# Patient Record
Sex: FEMALE | Race: WHITE | Employment: STUDENT | URBAN - METROPOLITAN AREA
[De-identification: names, ages, dates, MRNs, and addresses within clinical notes are randomized per-mention and may not be internally consistent; named-entity substitution may affect disease eponyms.]

---
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
## 2020-10-30 ENCOUNTER — OFFICE VISIT (OUTPATIENT)
Dept: PRIMARY CARE CLINIC | Age: 20
End: 2020-10-30
Payer: COMMERCIAL

## 2020-10-30 VITALS
HEART RATE: 81 BPM | TEMPERATURE: 98 F | SYSTOLIC BLOOD PRESSURE: 118 MMHG | RESPIRATION RATE: 16 BRPM | HEIGHT: 65 IN | OXYGEN SATURATION: 100 % | BODY MASS INDEX: 22.33 KG/M2 | DIASTOLIC BLOOD PRESSURE: 82 MMHG | WEIGHT: 134 LBS

## 2020-10-30 PROCEDURE — 99395 PREV VISIT EST AGE 18-39: CPT | Performed by: FAMILY MEDICINE

## 2020-10-30 RX ORDER — LEVONORGESTREL AND ETHINYL ESTRADIOL 0.15-0.03
KIT ORAL
COMMUNITY
Start: 2020-10-20

## 2020-10-30 RX ORDER — CLINDAMYCIN PHOSPHATE AND BENZOYL PEROXIDE 10; 37.5 MG/G; MG/G
GEL TOPICAL
COMMUNITY

## 2020-10-30 SDOH — ECONOMIC STABILITY: FOOD INSECURITY: WITHIN THE PAST 12 MONTHS, THE FOOD YOU BOUGHT JUST DIDN'T LAST AND YOU DIDN'T HAVE MONEY TO GET MORE.: NEVER TRUE

## 2020-10-30 SDOH — ECONOMIC STABILITY: INCOME INSECURITY: HOW HARD IS IT FOR YOU TO PAY FOR THE VERY BASICS LIKE FOOD, HOUSING, MEDICAL CARE, AND HEATING?: NOT HARD AT ALL

## 2020-10-30 SDOH — ECONOMIC STABILITY: TRANSPORTATION INSECURITY
IN THE PAST 12 MONTHS, HAS LACK OF TRANSPORTATION KEPT YOU FROM MEETINGS, WORK, OR FROM GETTING THINGS NEEDED FOR DAILY LIVING?: NO

## 2020-10-30 SDOH — ECONOMIC STABILITY: FOOD INSECURITY: WITHIN THE PAST 12 MONTHS, YOU WORRIED THAT YOUR FOOD WOULD RUN OUT BEFORE YOU GOT MONEY TO BUY MORE.: NEVER TRUE

## 2020-10-30 SDOH — ECONOMIC STABILITY: TRANSPORTATION INSECURITY
IN THE PAST 12 MONTHS, HAS THE LACK OF TRANSPORTATION KEPT YOU FROM MEDICAL APPOINTMENTS OR FROM GETTING MEDICATIONS?: NO

## 2020-10-30 ASSESSMENT — ENCOUNTER SYMPTOMS
COUGH: 0
RHINORRHEA: 0
TROUBLE SWALLOWING: 0
DIARRHEA: 0
NAUSEA: 0
EYE REDNESS: 0
BACK PAIN: 0
EYE ITCHING: 0
ABDOMINAL DISTENTION: 0
VOICE CHANGE: 0
EYE PAIN: 0
CHOKING: 0
EYE DISCHARGE: 0
BLOOD IN STOOL: 0
SINUS PAIN: 0
CONSTIPATION: 0
SORE THROAT: 0
PHOTOPHOBIA: 0
VOMITING: 0
ABDOMINAL PAIN: 0
COLOR CHANGE: 0
SINUS PRESSURE: 0
CHEST TIGHTNESS: 0
WHEEZING: 0
APNEA: 0
SHORTNESS OF BREATH: 0

## 2020-10-30 ASSESSMENT — PATIENT HEALTH QUESTIONNAIRE - PHQ9
SUM OF ALL RESPONSES TO PHQ9 QUESTIONS 1 & 2: 0
SUM OF ALL RESPONSES TO PHQ QUESTIONS 1-9: 0
1. LITTLE INTEREST OR PLEASURE IN DOING THINGS: 0
SUM OF ALL RESPONSES TO PHQ QUESTIONS 1-9: 0
2. FEELING DOWN, DEPRESSED OR HOPELESS: 0
SUM OF ALL RESPONSES TO PHQ QUESTIONS 1-9: 0

## 2020-10-30 NOTE — PROGRESS NOTES
SUBJECTIVE:  Patient ID: Lauren Paul is a 21 y.o. female. Chief Complaint:  Chief Complaint   Patient presents with    Annual Exam       HPI   21year old Female  Annual  3 rd Year UC psychology & Occupational therapy     No past medical history on file. Past Surgical History:   Procedure Laterality Date    ARM SURGERY Left Age 9   [facility] WISDOM TOOTH EXTRACTION  2016     No Known Allergies    Family History   Problem Relation Age of Onset    Other Mother         Thalessemia minor   [facility] Elevated Lipids Father     Diabetes Maternal Grandmother     Thyroid Disease Maternal Grandmother     Heart Attack Paternal Grandfather 79    Other Maternal Aunt         Thallassemia     Social History     Social History Narrative    Study Psychology        Patient Active Problem List   Diagnosis    Thalassemia minor     Current Outpatient Medications   Medication Sig Dispense Refill    Darline Amour 0.15-30 MG-MCG per tablet TK 1 T PO QD      tretinoin (RETIN-A) 0.025 % cream Apply topically nightly Apply topically nightly.  Clindamycin Phos-Benzoyl Perox (ONEXTON) 1.2-3.75 % GEL Apply topically      norethindrone-ethinyl estradiol (JUNEL FE 1/20) 1-20 MG-MCG per tablet Take 1 tablet by mouth daily      norgestimate-ethinyl estradiol (SPRINTEC 28) 0.25-35 MG-MCG per tablet Take 1 tablet by mouth daily       No current facility-administered medications for this visit.       Lab Results   Component Value Date    WBC 6.8 11/30/2018    HGB 9.5 (L) 11/30/2018    HCT 30.5 (L) 11/30/2018    MCV 54.9 (L) 11/30/2018     11/30/2018     No results found for: CHOL  No results found for: TRIG  No results found for: HDL  No results found for: LDLCHOLESTEROL, LDLCALC  No results found for: LABVLDL, VLDL  No results found for: [facility]    Chemistry        Component Value Date/Time     11/30/2018 1424    K 4.4 11/30/2018 1424     11/30/2018 1424    CO2 25 11/30/2018 1424    BUN 11 11/30/2018 1424    CREATININE 0.6 11/30/2018 1424        Component Value Date/Time    CALCIUM 9.6 11/30/2018 1424    ALKPHOS 79 11/30/2018 1424    AST 25 11/30/2018 1424    ALT 18 11/30/2018 1424    BILITOT 0.3 11/30/2018 1424            Review of Systems   Constitutional: Negative for activity change, appetite change, chills, diaphoresis, fatigue, fever and unexpected weight change. HENT: Negative for congestion, ear discharge, ear pain, hearing loss, nosebleeds, postnasal drip, rhinorrhea, sinus pressure, sinus pain, sore throat, tinnitus, trouble swallowing and voice change. Eyes: Negative for photophobia, pain, discharge, redness, itching and visual disturbance. Respiratory: Negative for apnea, cough, choking, chest tightness, shortness of breath and wheezing. Cardiovascular: Negative for chest pain, palpitations and leg swelling. Gastrointestinal: Negative for abdominal distention, abdominal pain, blood in stool, constipation, diarrhea, nausea and vomiting. Endocrine: Negative for cold intolerance, heat intolerance, polydipsia, polyphagia and polyuria. Genitourinary: Negative for dysuria and frequency. Musculoskeletal: Negative for back pain and gait problem. Skin: Negative for color change and rash. Allergic/Immunologic: Negative for environmental allergies and food allergies. Neurological: Negative for dizziness, tremors, light-headedness, numbness and headaches. Hematological:        Thalessemia    Psychiatric/Behavioral: Negative for agitation, behavioral problems, decreased concentration and sleep disturbance. The patient is not hyperactive. OBJECTIVE:  /82   Pulse 81   Temp 98 °F (36.7 °C)   Resp 16   Ht 5' 4.75\" (1.645 m)   Wt 134 lb (60.8 kg)   LMP 10/26/2020   SpO2 100%   BMI 22.47 kg/m²   Physical Exam  Constitutional:       General: She is not in acute distress. Appearance: She is well-developed. She is not diaphoretic. HENT:      Head: Normocephalic.       Right Ear: External ear normal.      Left Ear: External ear normal.      Nose: Nose normal.   Eyes:      General: No scleral icterus. Right eye: No discharge. Left eye: No discharge. Conjunctiva/sclera: Conjunctivae normal.      Pupils: Pupils are equal, round, and reactive to light. Neck:      Musculoskeletal: Normal range of motion and neck supple. Thyroid: No thyromegaly. Cardiovascular:      Rate and Rhythm: Normal rate and regular rhythm. Heart sounds: Normal heart sounds. No murmur. Pulmonary:      Effort: Pulmonary effort is normal. No respiratory distress. Breath sounds: Normal breath sounds. No wheezing or rales. Chest:      Chest wall: No tenderness. Abdominal:      General: Bowel sounds are normal. There is no distension. Palpations: Abdomen is soft. There is no mass. Tenderness: There is no abdominal tenderness. There is no guarding or rebound. Musculoskeletal: Normal range of motion. Lymphadenopathy:      Cervical: No cervical adenopathy. Skin:     General: Skin is warm. Findings: No rash. Neurological:      Mental Status: She is alert and oriented to person, place, and time. Deep Tendon Reflexes: Reflexes are normal and symmetric. Psychiatric:         Behavior: Behavior normal.         Thought Content: Thought content normal.         Judgment: Judgment normal.         ASSESSMENT/PLAN:      Diagnosis Orders   1.  Routine physical examination         Enriqueta Almazan paper done  Copy of immunization  Declined Flu shot

## 2021-02-26 ENCOUNTER — OFFICE VISIT (OUTPATIENT)
Dept: PRIMARY CARE CLINIC | Age: 21
End: 2021-02-26
Payer: COMMERCIAL

## 2021-02-26 VITALS
BODY MASS INDEX: 22.3 KG/M2 | HEART RATE: 72 BPM | SYSTOLIC BLOOD PRESSURE: 117 MMHG | WEIGHT: 133 LBS | DIASTOLIC BLOOD PRESSURE: 71 MMHG | TEMPERATURE: 97.3 F

## 2021-02-26 DIAGNOSIS — D56.3 THALASSEMIA MINOR: ICD-10-CM

## 2021-02-26 DIAGNOSIS — R07.89 CHEST TIGHTNESS: Primary | ICD-10-CM

## 2021-02-26 LAB
A/G RATIO: 1.7 (ref 1.1–2.2)
ALBUMIN SERPL-MCNC: 4.6 G/DL (ref 3.4–5)
ALP BLD-CCNC: 71 U/L (ref 40–129)
ALT SERPL-CCNC: 17 U/L (ref 10–40)
ANION GAP SERPL CALCULATED.3IONS-SCNC: 12 MMOL/L (ref 3–16)
AST SERPL-CCNC: 26 U/L (ref 15–37)
BASOPHILS ABSOLUTE: 0 K/UL (ref 0–0.2)
BASOPHILS RELATIVE PERCENT: 0.7 %
BILIRUB SERPL-MCNC: <0.2 MG/DL (ref 0–1)
BUN BLDV-MCNC: 9 MG/DL (ref 7–20)
CALCIUM SERPL-MCNC: 9.5 MG/DL (ref 8.3–10.6)
CHLORIDE BLD-SCNC: 107 MMOL/L (ref 99–110)
CO2: 24 MMOL/L (ref 21–32)
CREAT SERPL-MCNC: 0.6 MG/DL (ref 0.6–1.1)
EOSINOPHILS ABSOLUTE: 0.1 K/UL (ref 0–0.6)
EOSINOPHILS RELATIVE PERCENT: 1.4 %
GFR AFRICAN AMERICAN: >60
GFR NON-AFRICAN AMERICAN: >60
GLOBULIN: 2.7 G/DL
GLUCOSE BLD-MCNC: 77 MG/DL (ref 70–99)
HCT VFR BLD CALC: 31.8 % (ref 36–48)
HEMATOLOGY PATH CONSULT: NO
HEMOGLOBIN: 9.9 G/DL (ref 12–16)
LYMPHOCYTES ABSOLUTE: 2.8 K/UL (ref 1–5.1)
LYMPHOCYTES RELATIVE PERCENT: 43 %
MCH RBC QN AUTO: 17.1 PG (ref 26–34)
MCHC RBC AUTO-ENTMCNC: 31.3 G/DL (ref 31–36)
MCV RBC AUTO: 54.8 FL (ref 80–100)
MONOCYTES ABSOLUTE: 0.5 K/UL (ref 0–1.3)
MONOCYTES RELATIVE PERCENT: 7.7 %
NEUTROPHILS ABSOLUTE: 3.1 K/UL (ref 1.7–7.7)
NEUTROPHILS RELATIVE PERCENT: 47.2 %
PDW BLD-RTO: 17.4 % (ref 12.4–15.4)
PLATELET # BLD: 346 K/UL (ref 135–450)
PMV BLD AUTO: 9.5 FL (ref 5–10.5)
POTASSIUM SERPL-SCNC: 4.5 MMOL/L (ref 3.5–5.1)
RBC # BLD: 5.81 M/UL (ref 4–5.2)
SODIUM BLD-SCNC: 143 MMOL/L (ref 136–145)
TOTAL PROTEIN: 7.3 G/DL (ref 6.4–8.2)
TSH SERPL DL<=0.05 MIU/L-ACNC: 2.13 UIU/ML (ref 0.27–4.2)
WBC # BLD: 6.5 K/UL (ref 4–11)

## 2021-02-26 PROCEDURE — 99213 OFFICE O/P EST LOW 20 MIN: CPT | Performed by: FAMILY MEDICINE

## 2021-02-26 ASSESSMENT — ENCOUNTER SYMPTOMS
ALLERGIC/IMMUNOLOGIC NEGATIVE: 1
CHEST TIGHTNESS: 1
EYES NEGATIVE: 1
BACK PAIN: 0
WHEEZING: 0
ABDOMINAL PAIN: 0

## 2021-02-26 ASSESSMENT — PATIENT HEALTH QUESTIONNAIRE - PHQ9
1. LITTLE INTEREST OR PLEASURE IN DOING THINGS: 0
2. FEELING DOWN, DEPRESSED OR HOPELESS: 0
SUM OF ALL RESPONSES TO PHQ QUESTIONS 1-9: 0

## 2021-02-26 NOTE — PROGRESS NOTES
SUBJECTIVE:  Patient ID: Shannan Pham is a 21 y.o. female. 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 Patient Active Problem List   Diagnosis    Thalassemia minor     Current Outpatient Medications   Medication Sig Dispense Refill    KURVELO 0.15-30 MG-MCG per tablet TK 1 T PO QD      tretinoin (RETIN-A) 0.025 % cream Apply topically nightly Apply topically nightly.  Clindamycin Phos-Benzoyl Perox (ONEXTON) 1.2-3.75 % GEL Apply topically       No current facility-administered medications for this visit.       Lab Results   Component Value Date    WBC 6.8 11/30/2018    HGB 9.5 (L) 11/30/2018    HCT 30.5 (L) 11/30/2018    MCV 54.9 (L) 11/30/2018     11/30/2018     No results found for: CHOL  No results found for: TRIG  No results found for: HDL  No results found for: LDLCHOLESTEROL, LDLCALC  No results found for: LABVLDL, VLDL  No results found for: [facility]    Chemistry        Component Value Date/Time     11/30/2018 1424    K 4.4 11/30/2018 1424     11/30/2018 1424    CO2 25 11/30/2018 1424    BUN 11 11/30/2018 1424    CREATININE 0.6 11/30/2018 1424        Component Value Date/Time    CALCIUM 9.6 11/30/2018 1424    ALKPHOS 79 11/30/2018 1424    AST 25 11/30/2018 1424    ALT 18 11/30/2018 1424    BILITOT 0.3 11/30/2018 1424            Review of Systems   Constitutional: Negative. Negative for chills, fatigue and fever. HENT: Negative. Eyes: Negative. Respiratory: Positive for chest tightness. Negative for wheezing. Hx tested for asthma   It was 30 min after swimming   Told Chlorine sensitivity   Cardiovascular: Negative for chest pain and palpitations. Gastrointestinal: Negative for abdominal pain. Endocrine: Negative. Genitourinary: Negative. Negative for dysuria. Musculoskeletal: Negative for back pain and neck pain. Allergic/Immunologic: Negative. Neurological: Negative. Negative for dizziness, light-headedness and headaches. Hematological:        Thalassesmia    Psychiatric/Behavioral: Negative. Negative for behavioral problems. OBJECTIVE:  /71   Pulse 72   Temp 97.3 °F (36.3 °C)   Wt 133 lb (60.3 kg)   BMI 22.30 kg/m²   Physical Exam  Constitutional:       Appearance: Normal appearance. HENT:      Head: Normocephalic. Eyes:      Extraocular Movements: Extraocular movements intact. Neck:      Musculoskeletal: Normal range of motion. Cardiovascular:      Rate and Rhythm: Normal rate and regular rhythm. Heart sounds: Normal heart sounds. Pulmonary:      Effort: Pulmonary effort is normal.      Breath sounds: Normal breath sounds. Neurological:      General: No focal deficit present. Mental Status: She is alert and oriented to person, place, and time. ASSESSMENT/PLAN:      Diagnosis Orders   1. Chest tightness  Marta Jacobo MD, Cardiology, [facility]   2.  Thalassemia minor  CBC Auto Differential    Comprehensive Metabolic Panel    TSH without Reflex     As above

## 2021-03-01 ENCOUNTER — OFFICE VISIT (OUTPATIENT)
Dept: CARDIOLOGY CLINIC | Age: 21
End: 2021-03-01
Payer: COMMERCIAL

## 2021-03-01 ENCOUNTER — NURSE ONLY (OUTPATIENT)
Dept: CARDIOLOGY CLINIC | Age: 21
End: 2021-03-01

## 2021-03-01 ENCOUNTER — PROCEDURE VISIT (OUTPATIENT)
Dept: CARDIOLOGY CLINIC | Age: 21
End: 2021-03-01
Payer: COMMERCIAL

## 2021-03-01 VITALS
SYSTOLIC BLOOD PRESSURE: 122 MMHG | BODY MASS INDEX: 22.97 KG/M2 | WEIGHT: 137 LBS | TEMPERATURE: 97.7 F | DIASTOLIC BLOOD PRESSURE: 72 MMHG | HEART RATE: 78 BPM

## 2021-03-01 DIAGNOSIS — R07.89 CHEST TIGHTNESS: ICD-10-CM

## 2021-03-01 DIAGNOSIS — D56.3 THALASSEMIA MINOR: Primary | ICD-10-CM

## 2021-03-01 DIAGNOSIS — D64.9 ANEMIA, UNSPECIFIED TYPE: ICD-10-CM

## 2021-03-01 DIAGNOSIS — R55 PRE-SYNCOPE: ICD-10-CM

## 2021-03-01 DIAGNOSIS — R42 LIGHT HEADED: ICD-10-CM

## 2021-03-01 DIAGNOSIS — R07.9 CHEST PAIN, UNSPECIFIED TYPE: ICD-10-CM

## 2021-03-01 DIAGNOSIS — R07.9 CHEST PAIN, UNSPECIFIED TYPE: Primary | ICD-10-CM

## 2021-03-01 LAB
LV EF: 55 %
LVEF MODALITY: NORMAL

## 2021-03-01 PROCEDURE — 93246 EXT ECG>7D<15D RECORDING: CPT | Performed by: INTERNAL MEDICINE

## 2021-03-01 PROCEDURE — 93000 ELECTROCARDIOGRAM COMPLETE: CPT | Performed by: INTERNAL MEDICINE

## 2021-03-01 PROCEDURE — 99204 OFFICE O/P NEW MOD 45 MIN: CPT | Performed by: INTERNAL MEDICINE

## 2021-03-01 ASSESSMENT — ENCOUNTER SYMPTOMS
CHOKING: 0
SHORTNESS OF BREATH: 0
CHEST TIGHTNESS: 1
COUGH: 0

## 2021-03-01 NOTE — PROGRESS NOTES
Subjective:      Patient ID: Rere Benson is a 21 y.o. female. HPI  Referred for chest pain/chest tightness. Get dizziness and light headed while exercising. Gets chest pressure. Has been intermittent since 8 yrs old. Occurs only randomly. Always with exercise but not reproducible. No sx of palp/tachy. Feels like going to pass out. Can be wks to months between. May be more frequent now. Never completely passed out but feels as if she might. Feels fine otherwise. Lasts for 15-20 minutes but relatively normal. Eats consistently. Never evaluated during episode. When occurs, sits down until sx passed. Thal minor.      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[employer]    Social Needs    Financial resource strain: Not hard at all   [employer] insecurity     Worry: Never true     Inability: Never true    Transportation needs     Medical: No     Non-medical: No   Tobacco Use    Smoking status: Never Smoker    Smokeless tobacco: Never Used   Substance and Sexual Activity    Alcohol use: Not on file    Drug use: Not on file    Sexual activity: Not on file   Lifestyle    Physical activity     Days per week: Not on file     Minutes per session: Not on file    Stress: Not on file   Relationships    Social connections     Talks on phone: Not on file     Gets together: Not on file     Attends Spiritism service: Not on file     Active member of club or organization: Not on file     Attends meetings of clubs or organizations: Not on file     Relationship status: Not on file   [facility] Intimate partner violence     Fear of current or ex partner: Not on file     Emotionally abused: Not on file     Physically abused: Not on file     Forced sexual activity: Not on file   Other Topics Concern    Not on file   Social History Narrative    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 OSMAR  of MI in 62s. Vitals:    21 1420   BP: 122/72   Pulse: 78   Temp: 97.7 °F (36.5 °C)         Review of Systems   Constitutional: Negative for activity change, appetite change and fatigue. Respiratory: Positive for chest tightness. Negative for cough, choking and shortness of breath. Cardiovascular: Negative for chest pain, palpitations and leg swelling. Denies PND or orthopnea. No tachycardia or syncope. Neurological: Positive for dizziness and light-headedness. Negative for syncope. Psychiatric/Behavioral: Negative for agitation, behavioral problems and confusion. All other systems reviewed and are negative. Objective:   Physical Exam  Constitutional:       General: She is not in acute distress. Appearance: Normal appearance. She is well-developed. HENT:      Head: Normocephalic and atraumatic. Right Ear: External ear normal.      Left Ear: External ear normal.   Neck:      Musculoskeletal: Normal range of motion. Vascular: No JVD. Cardiovascular:      Rate and Rhythm: Normal rate and regular rhythm. Heart sounds: Normal heart sounds. No murmur. No gallop. Pulmonary:      Effort: Pulmonary effort is normal. No respiratory distress. Breath sounds: Normal breath sounds. No wheezing or rales. Chest:      Chest wall: No tenderness. Abdominal:      General: Bowel sounds are normal.      Palpations: Abdomen is soft. Tenderness: There is no abdominal tenderness. Musculoskeletal: Normal range of motion. General: No swelling. Skin:     General: Skin is warm and dry.    Neurological: General: No focal deficit present. Mental Status: She is alert and oriented to person, place, and time. Psychiatric:         Mood and Affect: Mood normal.         Behavior: Behavior normal.         Assessment:       Diagnosis Orders   1. Chest pain, unspecified type     2. Chest tightness     3. Light headed     4. Pre-syncope               Plan:      Sx relatively in frequent. Only when active. No sob. Uncertain what represents. not necessarily arrhythmic sounding. EKG today shows NSR, NSSTTW changes. Will have echo. GXT to try to provoke sx. Event recorder. Follow up after.          Jennifer Navarro MD

## 2021-03-02 PROCEDURE — 93306 TTE W/DOPPLER COMPLETE: CPT | Performed by: INTERNAL MEDICINE

## 2021-03-03 NOTE — PROGRESS NOTES
14 day Zio was placed during clinic for chest pain. Ordered by: Wily Duran.    Placed By: Padma Rogers  Dx: Chest pain

## 2021-03-12 ENCOUNTER — HOSPITAL ENCOUNTER (OUTPATIENT)
Dept: NON INVASIVE DIAGNOSTICS | Age: 21
Discharge: HOME | End: 2021-03-12
Payer: COMMERCIAL

## 2021-03-12 DIAGNOSIS — R07.9 CHEST PAIN, UNSPECIFIED TYPE: ICD-10-CM

## 2021-03-12 DIAGNOSIS — R55 PRE-SYNCOPE: ICD-10-CM

## 2021-03-12 DIAGNOSIS — R07.89 CHEST TIGHTNESS: ICD-10-CM

## 2021-03-12 DIAGNOSIS — R42 LIGHT HEADED: ICD-10-CM

## 2021-03-12 PROCEDURE — 93017 CV STRESS TEST TRACING ONLY: CPT

## 2021-03-22 ENCOUNTER — PATIENT MESSAGE (OUTPATIENT)
Dept: PRIMARY CARE CLINIC | Age: 21
End: 2021-03-22

## 2021-03-22 NOTE — TELEPHONE ENCOUNTER
From: Mary Monteiro  To: Leola Leon MD  Sent: 3/22/2021 12:15 PM EDT  Subject: Non-Urgent Medical Question    Hello again! 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 I was wondering if I had that done when I got my blood work taken or if I will need to schedule an appointment for that. Regardless I will be scheduling an appointment to have the immunization record sheet filled out and signed. I was just wondering if I should include the TB screening when I schedule as well.

## 2021-03-23 PROCEDURE — 93248 EXT ECG>7D<15D REV&INTERPJ: CPT | Performed by: INTERNAL MEDICINE

## 2021-03-24 ENCOUNTER — NURSE ONLY (OUTPATIENT)
Dept: FAMILY MEDICINE CLINIC | Age: 21
End: 2021-03-24
Payer: COMMERCIAL

## 2021-03-24 DIAGNOSIS — I20.0 UNSTABLE ANGINA PECTORIS (HCC): ICD-10-CM

## 2021-03-24 DIAGNOSIS — Z11.1 PPD SCREENING TEST: Primary | ICD-10-CM

## 2021-03-24 PROCEDURE — 86580 TB INTRADERMAL TEST: CPT | Performed by: FAMILY MEDICINE

## 2021-03-26 ENCOUNTER — NURSE ONLY (OUTPATIENT)
Dept: PRIMARY CARE CLINIC | Age: 21
End: 2021-03-26

## 2021-03-26 DIAGNOSIS — Z11.1 PPD SCREENING TEST: Primary | ICD-10-CM

## 2021-03-26 LAB
INDURATION: NORMAL
TB SKIN TEST: NEGATIVE

## 2021-04-07 ENCOUNTER — NURSE ONLY (OUTPATIENT)
Dept: PRIMARY CARE CLINIC | Age: 21
End: 2021-04-07
Payer: COMMERCIAL

## 2021-04-07 DIAGNOSIS — Z23 NEED FOR TUBERCULOSIS VACCINATION: Primary | ICD-10-CM

## 2021-04-07 PROCEDURE — 86580 TB INTRADERMAL TEST: CPT | Performed by: FAMILY MEDICINE

## 2021-04-08 ENCOUNTER — OFFICE VISIT (OUTPATIENT)
Dept: CARDIOLOGY CLINIC | Age: 21
End: 2021-04-08
Payer: COMMERCIAL

## 2021-04-08 VITALS
HEART RATE: 80 BPM | SYSTOLIC BLOOD PRESSURE: 110 MMHG | DIASTOLIC BLOOD PRESSURE: 80 MMHG | WEIGHT: 136 LBS | BODY MASS INDEX: 22.81 KG/M2

## 2021-04-08 DIAGNOSIS — R55 PRE-SYNCOPE: ICD-10-CM

## 2021-04-08 DIAGNOSIS — R07.9 CHEST PAIN, UNSPECIFIED TYPE: Primary | ICD-10-CM

## 2021-04-08 DIAGNOSIS — R42 LIGHT HEADED: ICD-10-CM

## 2021-04-08 DIAGNOSIS — R07.89 CHEST TIGHTNESS: ICD-10-CM

## 2021-04-08 PROCEDURE — G8427 DOCREV CUR MEDS BY ELIG CLIN: HCPCS | Performed by: INTERNAL MEDICINE

## 2021-04-08 PROCEDURE — 99213 OFFICE O/P EST LOW 20 MIN: CPT | Performed by: INTERNAL MEDICINE

## 2021-04-08 PROCEDURE — 1036F TOBACCO NON-USER: CPT | Performed by: INTERNAL MEDICINE

## 2021-04-08 PROCEDURE — G8420 CALC BMI NORM PARAMETERS: HCPCS | Performed by: INTERNAL MEDICINE

## 2021-04-08 ASSESSMENT — ENCOUNTER SYMPTOMS
CHEST TIGHTNESS: 1
CHOKING: 0
SHORTNESS OF BREATH: 0
COUGH: 0

## 2021-04-08 NOTE — PROGRESS NOTES
Subjective:      Patient ID: Norris Curiel is a 21 y.o. female. HPI  Follow up for chest pain/chest tightness/light headed/pre syncope. No sx for some tme. None since last visit. No cp/syncope. No tachy. No sob. Past Medical History:   Diagnosis Date    Premature baby     33 weeks  weight @Birth 4 Lb 12 oz     Past Surgical History:   Procedure Laterality Date    ARM SURGERY Left Age 9   [facility] WISDOM TOOTH EXTRACTION  2016     Social History     Socioeconomic History    Marital status: Single     Spouse name: Not on file    Number of children: Not on file    Years of education: Not on file    Highest education level: Not on file   Occupational History     Comment:  student/Psycology    Social Needs    Financial resource strain: Not hard at all   [employer] insecurity     Worry: Never true     Inability: Never true    Transportation needs     Medical: No     Non-medical: No   Tobacco Use    Smoking status: Never Smoker    Smokeless tobacco: Never Used   Substance and Sexual Activity    Alcohol use: Not on file    Drug use: Not on file    Sexual activity: Not on file   Lifestyle    Physical activity     Days per week: Not on file     Minutes per session: Not on file    Stress: Not on file   Relationships    Social connections     Talks on phone: Not on file     Gets together: Not on file     Attends Muslim service: Not on file     Active member of club or organization: Not on file     Attends meetings of clubs or organizations: Not on file     Relationship status: Not on file    Intimate partner violence     Fear of current or ex partner: Not on file     Emotionally abused: Not on file     Physically abused: Not on file     Forced sexual activity: Not on file   Other Topics Concern    Not on file   Social History Narrative    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 GF  of MI in 62s.      Vitals:    21 1007   BP: 110/80 Pulse: 80         Review of Systems   Constitutional: Negative for activity change, appetite change and fatigue. Respiratory: Positive for chest tightness. Negative for cough, choking and shortness of breath. Cardiovascular: Negative for chest pain, palpitations and leg swelling. Denies PND or orthopnea. No tachycardia or syncope. Neurological: Positive for dizziness and light-headedness. Negative for syncope. Psychiatric/Behavioral: Negative for agitation, behavioral problems and confusion. All other systems reviewed and are negative. Objective:   Physical Exam  Constitutional:       General: She is not in acute distress. Appearance: Normal appearance. She is well-developed. HENT:      Head: Normocephalic and atraumatic. Right Ear: External ear normal.      Left Ear: External ear normal.   Neck:      Musculoskeletal: Normal range of motion. Vascular: No JVD. Cardiovascular:      Rate and Rhythm: Normal rate and regular rhythm. Heart sounds: Normal heart sounds. No murmur. No gallop. Pulmonary:      Effort: Pulmonary effort is normal. No respiratory distress. Breath sounds: Normal breath sounds. No wheezing or rales. Chest:      Chest wall: No tenderness. Abdominal:      General: Bowel sounds are normal.      Palpations: Abdomen is soft. Tenderness: There is no abdominal tenderness. Musculoskeletal: Normal range of motion. General: No swelling. Skin:     General: Skin is warm and dry. Neurological:      General: No focal deficit present. Mental Status: She is alert and oriented to person, place, and time. Psychiatric:         Mood and Affect: Mood normal.         Behavior: Behavior normal.         Assessment:       Diagnosis Orders   1. Chest pain, unspecified type     2. Chest tightness     3. Light headed     4. Pre-syncope             Plan:      Sx relatively in frequent. No sx since last visit.  Reviewed previous records and testing including echo/stress 3/21. GXT with excellent ex kwesi and negative. Zio showing NSR,  Rare PVC/PAC. AVG HR 90. No marked radha. Follow up open ended.          Faraz Boyer MD

## 2021-04-09 ENCOUNTER — NURSE ONLY (OUTPATIENT)
Dept: PRIMARY CARE CLINIC | Age: 21
End: 2021-04-09

## 2022-07-15 ENCOUNTER — OFFICE VISIT (OUTPATIENT)
Dept: URGENT CARE | Age: 22
End: 2022-07-15
Payer: COMMERCIAL

## 2022-07-15 VITALS
HEIGHT: 64 IN | BODY MASS INDEX: 21.34 KG/M2 | HEART RATE: 98 BPM | WEIGHT: 125 LBS | TEMPERATURE: 98.5 F | OXYGEN SATURATION: 99 % | SYSTOLIC BLOOD PRESSURE: 110 MMHG | RESPIRATION RATE: 18 BRPM | DIASTOLIC BLOOD PRESSURE: 71 MMHG

## 2022-07-15 DIAGNOSIS — Z91.89 AT INCREASED RISK OF EXPOSURE TO COVID-19 VIRUS: ICD-10-CM

## 2022-07-15 DIAGNOSIS — J01.10 ACUTE NON-RECURRENT FRONTAL SINUSITIS: Primary | ICD-10-CM

## 2022-07-15 LAB
Lab: NORMAL
PERFORMING INSTRUMENT: NORMAL
QC PASS/FAIL: NORMAL
SARS-COV-2, POC: NORMAL

## 2022-07-15 PROCEDURE — G8428 CUR MEDS NOT DOCUMENT: HCPCS | Performed by: NURSE PRACTITIONER

## 2022-07-15 PROCEDURE — 87426 SARSCOV CORONAVIRUS AG IA: CPT | Performed by: NURSE PRACTITIONER

## 2022-07-15 PROCEDURE — G8420 CALC BMI NORM PARAMETERS: HCPCS | Performed by: NURSE PRACTITIONER

## 2022-07-15 PROCEDURE — 1036F TOBACCO NON-USER: CPT | Performed by: NURSE PRACTITIONER

## 2022-07-15 PROCEDURE — 99213 OFFICE O/P EST LOW 20 MIN: CPT | Performed by: NURSE PRACTITIONER

## 2022-07-15 RX ORDER — FLUTICASONE PROPIONATE 50 MCG
1 SPRAY, SUSPENSION (ML) NASAL DAILY
Qty: 1 EACH | Refills: 1 | Status: SHIPPED | OUTPATIENT
Start: 2022-07-15 | End: 2022-07-22

## 2022-07-15 RX ORDER — AMOXICILLIN AND CLAVULANATE POTASSIUM 875; 125 MG/1; MG/1
1 TABLET, FILM COATED ORAL 2 TIMES DAILY
Qty: 20 TABLET | Refills: 0 | Status: SHIPPED | OUTPATIENT
Start: 2022-07-15 | End: 2022-07-25

## 2022-07-15 ASSESSMENT — ENCOUNTER SYMPTOMS
RHINORRHEA: 0
EYE REDNESS: 1
EYE DISCHARGE: 0
VOICE CHANGE: 0
EYE ITCHING: 0
VISUAL CHANGE: 0
BLURRED VISION: 0
ABDOMINAL PAIN: 0
SHORTNESS OF BREATH: 0
COUGH: 1
FACIAL SWEATING: 0
PHOTOPHOBIA: 0
DIARRHEA: 0
NAUSEA: 0
VOMITING: 0
SINUS PRESSURE: 1
SINUS PAIN: 1
EYE PAIN: 0
EYE WATERING: 0
WHEEZING: 0
SORE THROAT: 0

## 2022-07-15 NOTE — PROGRESS NOTES
[address] [address]  Dept: [phone]  Dept Fax: [phone]  Loc: [phone]  Becky Orourke is a 25 y.o. female who presents today for her medical conditions/complaintsas noted below. Becky Orourke is c/o of Headache, Eye Problem, Congestion, and Cough      HPI:       History provided by:  Parent and patient   used: No    Headache   This is a new problem. The current episode started in the past 7 days. The problem occurs intermittently. The problem has been unchanged. The pain is located in the Frontal region. The pain does not radiate. The pain quality is similar to prior headaches. The quality of the pain is described as aching. The pain is at a severity of 6/10. The pain is moderate. Associated symptoms include coughing, ear pain, eye redness and sinus pressure. Pertinent negatives include no abdominal pain, abnormal behavior, blurred vision, dizziness, drainage, eye pain, eye watering, facial sweating, fever, hearing loss, insomnia, loss of balance, muscle aches, nausea, neck pain, numbness, photophobia, rhinorrhea, sore throat, tingling, tinnitus, visual change, vomiting or weakness. Eye Problem   Associated symptoms include eye redness. Pertinent negatives include no blurred vision, eye discharge, fever, itching, nausea, photophobia, tingling, vomiting or weakness. Cough  Associated symptoms include ear pain, eye redness, headaches and postnasal drip. Pertinent negatives include no chest pain, chills, fever, rhinorrhea, sore throat, shortness of breath or wheezing. There is no history of environmental allergies.      Past Medical History:   Diagnosis Date    Premature baby     33 weeks  weight @Birth 4 Lb 12 oz      Past Surgical History:   Procedure Laterality Date    ARM SURGERY Left Age 9    WISDOM TOOTH EXTRACTION  2016       Family History   Problem Relation Age of Onset    Other Mother Thalessemia minor    Elevated Lipids Father     Diabetes Maternal Grandmother     Thyroid Disease Maternal Grandmother     Heart Attack Paternal Grandfather 79    Other Maternal Aunt         Thallassemia       Social History     Tobacco Use    Smoking status: Never    Smokeless tobacco: Never   Substance Use Topics    Alcohol use: Not on file      Current Outpatient Medications   Medication Sig Dispense Refill    KURVELO 0.15-30 MG-MCG per tablet TK 1 T PO QD      tretinoin (RETIN-A) 0.025 % cream Apply topically nightly Apply topically nightly. Clindamycin Phos-Benzoyl Perox (ONEXTON) 1.2-3.75 % GEL Apply topically       No current facility-administered medications for this visit. No Known Allergies    Health Maintenance   Topic Date Due    HPV vaccine (1 - 2-dose series) Never done    HIV screen  Never done    Chlamydia screen  Never done    Hepatitis C screen  Never done    Pap smear  Never done    DTaP/Tdap/Td vaccine (7 - Td or Tdap) 07/19/2021    Depression Screen  02/26/2022    COVID-19 Vaccine (3 - Booster for Pfizer series) 03/12/2022    Flu vaccine (1) 09/01/2022    Hepatitis A vaccine  Completed    Hepatitis B vaccine  Completed    Hib vaccine  Completed    Varicella vaccine  Completed    Meningococcal (ACWY) vaccine  Completed    Pneumococcal 0-64 years Vaccine  Aged Out       Subjective:     Review of Systems   Constitutional:  Negative for activity change, appetite change, chills, fatigue and fever. HENT:  Positive for congestion, ear pain, postnasal drip, sinus pressure and sinus pain. Negative for ear discharge, hearing loss, mouth sores, nosebleeds, rhinorrhea, sneezing, sore throat, tinnitus and voice change. Eyes:  Positive for redness. Negative for blurred vision, photophobia, pain, discharge, itching and visual disturbance. Respiratory:  Positive for cough. Negative for shortness of breath and wheezing. Cardiovascular:  Negative for chest pain.    Gastrointestinal:  Negative for abdominal pain, diarrhea, nausea and vomiting. Endocrine: Negative. Musculoskeletal: Negative. Negative for neck pain. Skin: Negative. Allergic/Immunologic: Negative for environmental allergies. Neurological:  Positive for headaches. Negative for dizziness, tingling, weakness, light-headedness, numbness and loss of balance. Psychiatric/Behavioral: Negative. The patient does not have insomnia. Objective:     Physical Exam  Vitals and nursing note reviewed. Constitutional:       General: She is not in acute distress. HENT:      Head: Normocephalic and atraumatic. No left periorbital erythema. Jaw: No tenderness, swelling or pain on movement. Salivary Glands: Right salivary gland is not diffusely enlarged or tender. Left salivary gland is not diffusely enlarged or tender. Right Ear: Tympanic membrane, ear canal and external ear normal. There is no impacted cerumen. Left Ear: Tympanic membrane, ear canal and external ear normal. There is no impacted cerumen. Nose: Congestion present. No rhinorrhea. Mouth/Throat:      Mouth: Mucous membranes are moist.      Pharynx: No oropharyngeal exudate or posterior oropharyngeal erythema. Eyes:      General:         Right eye: No discharge. Left eye: No discharge. Extraocular Movements: Extraocular movements intact. Conjunctiva/sclera: Conjunctivae normal.      Pupils: Pupils are equal, round, and reactive to light. Neck:      Vascular: No carotid bruit. Cardiovascular:      Rate and Rhythm: Normal rate and regular rhythm. Pulses: Normal pulses. Heart sounds: Normal heart sounds. No murmur heard. Pulmonary:      Effort: Pulmonary effort is normal. No respiratory distress. Breath sounds: Normal breath sounds. No wheezing or rhonchi. Abdominal:      General: Bowel sounds are normal.      Palpations: Abdomen is soft. Musculoskeletal:         General: Normal range of motion. Cervical back: Normal range of motion. No rigidity or tenderness. Lymphadenopathy:      Cervical: Cervical adenopathy present. Skin:     General: Skin is warm and dry. Capillary Refill: Capillary refill takes less than 2 seconds. Neurological:      General: No focal deficit present. Mental Status: She is alert and oriented to person, place, and time. Psychiatric:         Behavior: Behavior normal.     /71   Pulse 98   Temp 98.5 °F (36.9 °C)   Resp 18   Ht 5' 4\" (1.626 m)   Wt 125 lb (56.7 kg)   LMP 07/05/2022 (Exact Date)   SpO2 99%   BMI 21.46 kg/m²     Assessment:     Results for orders placed or performed in visit on 07/15/22   POCT COVID-19, Antigen   Result Value Ref Range    SARS-COV-2, POC Not-Detected Not Detected    Lot Number [id number]     QC Pass/Fail pass     Performing Instrument BD Veritor          Plan:       Diagnosis Orders   1. Acute non-recurrent frontal sinusitis  amoxicillin-clavulanate (AUGMENTIN) 875-125 MG per tablet    fluticasone (FLONASE) 50 MCG/ACT nasal spray      2. At increased risk of exposure to COVID-19 virus  POCT COVID-19, Antigen         Discussed negative COVID test with pt and mother    Discussed plan of care. Pt and mother agrees with plan of care    Patient given educationalmaterials - see patient instructions. Discussed use, benefit, and side effectsof prescribed medications. All patient questions answered. Pt voiced understanding. Reviewed health maintenance. Instructed to continue current medications, diet andexercise. Patient agreed with treatment plan. Follow up as directed.          Electronically signed by EVAN Maria CNP on 7/15/2022 at 8:26 AM

## 2022-07-18 SDOH — HEALTH STABILITY: PHYSICAL HEALTH: ON AVERAGE, HOW MANY MINUTES DO YOU ENGAGE IN EXERCISE AT THIS LEVEL?: 30 MIN

## 2022-07-18 SDOH — HEALTH STABILITY: PHYSICAL HEALTH: ON AVERAGE, HOW MANY DAYS PER WEEK DO YOU ENGAGE IN MODERATE TO STRENUOUS EXERCISE (LIKE A BRISK WALK)?: 3 DAYS

## 2022-07-19 ENCOUNTER — OFFICE VISIT (OUTPATIENT)
Dept: ORTHOPEDIC SURGERY | Age: 22
End: 2022-07-19
Payer: COMMERCIAL

## 2022-07-19 VITALS — BODY MASS INDEX: 21.34 KG/M2 | WEIGHT: 125 LBS | HEIGHT: 64 IN

## 2022-07-19 DIAGNOSIS — M25.561 RIGHT KNEE PAIN, UNSPECIFIED CHRONICITY: Primary | ICD-10-CM

## 2022-07-19 PROCEDURE — MISC265 BAUERFEIND GENUTRAIN KNEE SLEEVE: Performed by: PHYSICIAN ASSISTANT

## 2022-07-19 PROCEDURE — 99204 OFFICE O/P NEW MOD 45 MIN: CPT | Performed by: PHYSICIAN ASSISTANT

## 2022-07-19 NOTE — PROGRESS NOTES
Date:  2022    Name:  Mark Warner  Address:  [address] [place], [place] [address]  [address] [place] [id number]    :  2000      Age:   25 y.o.    SSN:  [id number]      Medical Record Number:  [id number]    Reason for Visit:    Chief Complaint    New Patient (Knee pain/Right knee/)      DOS:2022     HPI: Rosa M Mcclure is a 25 y.o. female here today for evaluation of right knee pain is been ongoing for about 2 months with no associated injury. Patient describes pain that is primarily lateral.  It she states that she is able to \"pop her knees \"and she went to pop her right knee and her right knee has not felt right ever since patient describes pain as being lateral.  This is worse with any type of activity. This keeps her up at night. She has tried anti-inflammatories conservative measures at like at home therapy with no improvement. 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        Past Medical History:   Diagnosis Date    Premature baby     33 weeks  weight @Birth 4 Lb 12 oz        Past Surgical History:   Procedure Laterality Date    ARM SURGERY Left Age 9    WISDOM TOOTH EXTRACTION  2016       Family History   Problem Relation Age of Onset    Other Mother         Thalessemia minor    Elevated Lipids Father     Diabetes Maternal Grandmother     Thyroid Disease Maternal Grandmother     Heart Attack Paternal Grandfather 79    Other Maternal Aunt         Cholo Nicoleer Social History     Socioeconomic History    Marital status: Single     Spouse name: None    Number of children: None    Years of education: None    Highest education level: None   Occupational History     Comment:  student/Psycology    Tobacco Use    Smoking status: Never    Smokeless tobacco: Never   Social History Narrative    Study Psychology      Student    No Tobacco    No alcohol     Social Determinants of Health     Physical Activity: Insufficiently Active    Days of Exercise per Week: 3 days    Minutes of Exercise per Session: 30 min   Intimate Partner Violence: Not At Risk    Fear of Current or Ex-Partner: No    Emotionally Abused: No    Physically Abused: No    Sexually Abused: No       Current Outpatient Medications   Medication Sig Dispense Refill    amoxicillin-clavulanate (AUGMENTIN) 875-125 MG per tablet Take 1 tablet by mouth in the morning and 1 tablet before bedtime. Do all this for 10 days. 20 tablet 0    fluticasone (FLONASE) 50 MCG/ACT nasal spray 1 spray by Each Nostril route in the morning for 7 days. 1 each 1    KURVELO 0.15-30 MG-MCG per tablet TK 1 T PO QD      tretinoin (RETIN-A) 0.025 % cream Apply topically nightly Apply topically nightly. Clindamycin Phos-Benzoyl Perox (ONEXTON) 1.2-3.75 % GEL Apply topically       No current facility-administered medications for this visit. No Known Allergies    Vital signs:  Ht 5' 4\" (1.626 m)   Wt 125 lb (56.7 kg)   LMP 07/05/2022   BMI 21.46 kg/m²        right knee exam    Gait: No use of assistive devices. No antalgic gait. Alignment: normal alignment. Inspection/skin: Skin is intact without erythema or ecchymosis. No gross deformity. Palpation: Tenderness to palpation over fibular head, lateral joint line tenderness, to light palpation deep posterior    Range of Motion: There is full range of motion. Strength: Normal quadriceps development. Effusion: No effusion or swelling present.      Ligamentous stability: No cruciate or collateral ligament instability. Neurologic and vascular: Skin is warm and well-perfused. Sensation is intact to light-touch. Special tests: Negative Contreras sign. Left knee exam    Gait: No use of assistive devices. No antalgic gait. Alignment: normal alignment. Inspection/skin: Skin is intact without erythema or ecchymosis. No gross deformity. Palpation: mild crepitus. no joint line tenderness present. Range of Motion: There is full range of motion. Strength: Normal quadriceps development. Effusion: No effusion or swelling present. Ligamentous stability: No cruciate or collateral ligament instability. Neurologic and vascular: Skin is warm and well-perfused. Sensation is intact to light-touch. Special tests: Negative Contreras sign. Diagnostics:  Radiology:       Pertinent imaging was obtained, interpreted, and reviewed with the patient today, images only - no report available. Radiographs were obtained and reviewed in the office; 4 views: bilateral PA, bilateral Teresita Montana, bilateral Merchants AND right lateral    Impression: Normal radiographic imaging exam of the right    Office Procedures:  Orders Placed This Encounter   Procedures    [employer] Genutrain Knee Sleeve $75     Patient was supplied a [employer] Genutrain Knee Sleeve. This retail item was supplied to provide functional support and assist in protecting the affected area. Verbal and written instructions for the use of and application of this item were provided. The patient was educated and fit by a healthcare professional with expert knowledge and specialization in brace application. They were instructed to contact the office immediately should the equipment result in increased pain, decreased sensation, increased swelling or worsening of the condition.     MRI KNEE RIGHT WO CONTRAST     Standing Status:   Future     Standing Expiration Date:   7/19/2023 Order Specific Question:   Reason for exam:     Answer:   MRI R KNEE W/3D EVAL LCL/FIBULAR HEAD     Order Specific Question:   Reason for exam:     Answer:   Rosette Holter TO [facility] PACS     Order Specific Question:   What is the sedation requirement? Answer:   None       Assessment: 77-year-old female with right lateral knee pain    Plan: Pertinent imaging was reviewed. The etiology, natural history, and treatment options for the disorder were discussed. The roles of activity medication, antiinflammatories, injections, bracing, physical therapy, and surgical interventions were all described to the patient and questions were answered. Patient has had right lateral knee pain for several months now. This is worse with any type of ambulation or weightbearing. This is gotten to the point where it keeps her up at night because of twisting activity. Pain is primarily over fibular head and lateral posterior joint line. At this time is candidate for MRI to evaluate fibular head, LCL injury, lateral meniscus tear. She would like to proceed with this. Follow-up for MRI results  Maria Isabel Showers is in agreement with this plan. All questions were answered to patient's satisfaction and was encouraged to call with any further questions. Total time spent for evaluation, education, and development of treatment plan: 45 minutes    Deann Maryland, [address]  7/19/2022      This dictation was performed with a verbal recognition program (DRAGON) and it was checked for errors. It is possible that there are still dictated errors within this office note. If so, please bring any areas to my attention for an addendum. All efforts were made to ensure that this office note is accurate.

## 2022-07-22 ENCOUNTER — OFFICE VISIT (OUTPATIENT)
Dept: ORTHOPEDIC SURGERY | Age: 22
End: 2022-07-22
Payer: COMMERCIAL

## 2022-07-22 VITALS — BODY MASS INDEX: 21.34 KG/M2 | WEIGHT: 125 LBS | HEIGHT: 64 IN

## 2022-07-22 DIAGNOSIS — M84.363A STRESS FRACTURE OF RIGHT FIBULA, INITIAL ENCOUNTER: Primary | ICD-10-CM

## 2022-07-22 PROCEDURE — 99214 OFFICE O/P EST MOD 30 MIN: CPT | Performed by: PHYSICIAN ASSISTANT

## 2022-07-22 RX ORDER — DICLOFENAC SODIUM 75 MG/1
75 TABLET, DELAYED RELEASE ORAL 2 TIMES DAILY
Qty: 60 TABLET | Refills: 3 | Status: SHIPPED | OUTPATIENT
Start: 2022-07-22 | End: 2022-08-24

## 2022-07-22 NOTE — PROGRESS NOTES
Chief Complaint  Follow-up (Rt Knee MRI Results)      History of Present Illness:  Mini Magaña is a pleasant 25 y.o. female here for follow-up regarding her right knee. As a review she has had pain for about 2 months with no associated injury. She does report one instance where she \"popped her right knee, as she usually does, and she has not felt the same since. Pain is primarily lateral posterior. This is tender to the touch. At her last visit an MRI was ordered to evaluate the fibular head as well as the LCL and lateral meniscus, she is here for its review. There have been no changes since last visit. Medical History:  Patient's medications, allergies, past medical, surgical, social and family histories were reviewed and updated as appropriate. ROS: Review of systems reviewed from Patient History Form completed today and available in the patient's chart under the Media tab. Pertinent items are noted in HPI  Review of systems reviewed from Patient History Form completed today and available in the patient's chart under the Media tab. Vital Signs:  Ht 5' 4\" (1.626 m)   Wt 125 lb (56.7 kg)   LMP 07/05/2022   BMI 21.46 kg/m²     right knee exam     Gait: No use of assistive devices. No antalgic gait. Alignment: normal alignment. Inspection/skin: Skin is intact without erythema or ecchymosis. No gross deformity. Palpation: Tenderness to palpation over fibular head, lateral joint line tenderness, to light palpation deep posterior     Range of Motion: There is full range of motion. Strength: Normal quadriceps development. Effusion: No effusion or swelling present. Ligamentous stability: No cruciate or collateral ligament instability. Neurologic and vascular: Skin is warm and well-perfused. Sensation is intact to light-touch. Special tests: Negative Contreras sign. Left knee exam     Gait: No use of assistive devices. No antalgic gait.      Alignment: as a popliteal stress reaction. At this time she is a candidate for brace, crutches, and anti-inflammatories. Follow-up in 3 weeks or sooner if worsening symptoms    Daniel Leon is in agreement with this plan. All questions were answered to patient's satisfaction and was encouraged to call with any further questions. Total time spent for evaluation, education, and development of treatment plan: 35 minutes    Alton Ward, [address]  7/22/2022    This dictation was performed with a verbal recognition program [facility]) and it was checked for errors. It is possible that there are still dictated errors within this office note. If so, please bring any areas to my attention for an addendum. All efforts were made to ensure that this office note is accurate.

## 2022-07-28 ENCOUNTER — TELEPHONE (OUTPATIENT)
Dept: ORTHOPEDIC SURGERY | Age: 22
End: 2022-07-28

## 2022-07-28 NOTE — TELEPHONE ENCOUNTER
General Question     Subject: PAT HAS SAME DAY APPT  Patient and /or Facility Request: Mitch Joaquins  Contact Number: [phone]     PAT CALLING TO SEE IF SHE COULD DO MORE TO GET A RESPONSE TO HER [employer] MESSAGE. PT IS GOING TO [place] TOMORROW AND HOPING TO HEAR BACK ASAP    PAT HAS 07/28/22 APPT @ 3:15 PM TODAY. SHE WOULD STILL LIKE A CALL, IF POSSIBLE, IN CASE SHE DOESN'T NEED TO BE SEEN. PLEASE CALL PAT @ # ABOVE.

## 2022-08-01 ENCOUNTER — TELEPHONE (OUTPATIENT)
Dept: ORTHOPEDIC SURGERY | Age: 22
End: 2022-08-01

## 2022-08-01 NOTE — TELEPHONE ENCOUNTER
General Question     Subject: ADVICE  Patient and /or Facility Request: Juma Puri  Contact Number:  [phone]      PT HAS BEEN IN A LOT OF PAIN EVEN WITH THE MEDICINE PRESCRIBED. PT STATES SHE HAS BEEN STAYING OFF THE LEG AS MUCH AS POSSIBLE, USING THE WHEELCHAIR AND CRUTCHES TO GET AROUND. PT STATES SHE HAS BEEN HAVING TO TAKE ADDITIONAL TYLENOL WITH THE DICLOFENAC IN ORDER TO GET SOME RELIEF BUT IT DOES NOT HELP MUCH. PT REQ CALLBACK TO DISCUSS NEXT STEPS. SHE WILL BE ON VACATION FOR ANOTHER WEEK AND SHE WANTS TO KNOW SOME THINGS THAT CAN HELP WITH THE PAIN WHILE SHE IS GONE. PT ALSO SENT [employer] MESSAGE. PLEASE CONTACT PT AT NUMBER LISTED ABOVE TO ADVISE.

## 2022-08-03 DIAGNOSIS — R25.2 SPASMS OF THE HANDS OR FEET: ICD-10-CM

## 2022-08-03 DIAGNOSIS — M84.363A STRESS FRACTURE OF RIGHT FIBULA, INITIAL ENCOUNTER: Primary | ICD-10-CM

## 2022-08-03 RX ORDER — BACLOFEN 10 MG/1
10 TABLET ORAL 3 TIMES DAILY
Qty: 30 TABLET | Refills: 0 | Status: CANCELLED | OUTPATIENT
Start: 2022-08-03

## 2022-08-10 ENCOUNTER — OFFICE VISIT (OUTPATIENT)
Dept: ORTHOPEDIC SURGERY | Age: 22
End: 2022-08-10
Payer: COMMERCIAL

## 2022-08-10 VITALS — WEIGHT: 125 LBS | BODY MASS INDEX: 21.34 KG/M2 | HEIGHT: 64 IN | TEMPERATURE: 98.1 F

## 2022-08-10 DIAGNOSIS — S86.911D STRAIN OF RIGHT KNEE, SUBSEQUENT ENCOUNTER: Primary | ICD-10-CM

## 2022-08-10 PROCEDURE — 99214 OFFICE O/P EST MOD 30 MIN: CPT | Performed by: ORTHOPAEDIC SURGERY

## 2022-08-10 PROCEDURE — L1832 KO ADJ JNT POS R SUP PRE CST: HCPCS | Performed by: ORTHOPAEDIC SURGERY

## 2022-08-10 NOTE — PROGRESS NOTES
Chief Complaint  Follow-up (Right knee)      History of Present Illness:  Mini Magaña is a pleasant 25 y.o. female here for follow-up regarding her right knee. As review she has had about 6 weeks of pain with no associated injury. MRI was obtained which showed a fibular head stress reaction as well as popliteal strain. She was instructed to use her crutches and brace and take some anti-inflammatory. Patient has not experienced any relief in fact she thinks that her pain has gotten worse. Medical History:  Patient's medications, allergies, past medical, surgical, social and family histories were reviewed and updated as appropriate. ROS: Review of systems reviewed from Patient History Form completed today and available in the patient's chart under the Media tab. Pertinent items are noted in HPI  Review of systems reviewed from Patient History Form completed today and available in the patient's chart under the Media tab. Vital Signs: There were no vitals taken for this visit.      right knee exam     Gait: No use of assistive devices. No antalgic gait. Alignment: normal alignment. Inspection/skin: Skin is intact without erythema or ecchymosis. No gross deformity. Palpation: Tenderness to palpation over fibular head, lateral joint line tenderness, to light palpation deep posterior      Range of Motion: There is full range of motion. Strength: Normal quadriceps development. Effusion: No effusion or swelling present. Ligamentous stability: No cruciate or collateral ligament instability. Neurologic and vascular: Skin is warm and well-perfused. Sensation is intact to light-touch. Special tests: Negative Contreras sign. Left knee exam     Gait: No use of assistive devices. No antalgic gait. Alignment: normal alignment. Inspection/skin: Skin is intact without erythema or ecchymosis. No gross deformity. Palpation: mild crepitus.  no joint line tenderness present. Range of Motion: There is full range of motion. Strength: Normal quadriceps development. Effusion: No effusion or swelling present. Ligamentous stability: No cruciate or collateral ligament instability. Neurologic and vascular: Skin is warm and well-perfused. Sensation is intact to light-touch. Special tests: Negative Contreras sign. Radiology:       Pertinent imaging was interpreted and reviewed with the patient today, both images and report. Right knee MRI on 07/21/2022     CONCLUSION:   1. Intermediate to high-grade marrow edema associated with the anterior medial aspect of the    proximal fibular head; stress response versus contusion. No fracture. 2. Lateral collateral ligament is intact. No swelling directly associated with the ligament,    but there is mild swelling along the popliteus tendon sheath. 3. Multiple muscle strains, including the popliteus, in the posterolateral corner. 4. Portions of the peroneal nerve imaged are unremarkable. Consider T1 axial data sets to    further evaluate the peroneal nerve as clinically warranted. Assessment :  22-year-old female with head stress reaction as well as popliteal muscle strain    Impression:  Encounter Diagnosis   Name Primary? Strain of right knee, subsequent encounter Yes       Office Procedures:  Orders Placed This Encounter   Procedures    Ambulatory referral to Physical Therapy     Referral Priority:   Routine     Referral Type:   Eval and Treat     Referral Reason:   Specialty Services Required     Number of Visits Requested:   1    Breg T Scope Knee Brace     Patient was prescribed a Breg T-Scope Brace. The right knee will require stabilization / immobilization from this semi-rigid / rigid orthosis to improve their function. The orthosis will assist in protecting the affected area, provide functional support and facilitate healing.     The prefabricated orthosis was modified in the following manner to provide a customizable fit for the patient at the time of delivery. 1.  Identification of appropriate positioning and alignment of anatomical landmarks. 2.  Trimming of straps and adjustment of frame to specifically fit patient. 3.  Polycentric hinge adjustment in flexion and extension. The patient was educated and fit by a healthcare professional with expert knowledge and specialization in brace application while under the direct supervision of the treating physician. Verbal and written instructions for the use of and application of this item were provided. They were instructed to contact the office immediately should the brace result in increased pain, decreased sensation, increased swelling or worsening of the condition. Plan: Pertinent imaging was reviewed. The etiology, natural history, and treatment options for the disorder were discussed. The roles of activity medication, antiinflammatories, injections, bracing, physical therapy, and surgical interventions were all described to the patient and questions were answered. Again, MRI confirms finding of a fibular head stress reaction as well as a popliteal strain. At this time she is a candidate for brace, crutches, and anti-inflammatories. I would like her to be fit with a brace as well. At this time she is a candidate for formal supervised PT. Follow up in 2 weeks or sooner  Rosemarie Rea is in agreement with this plan. All questions were answered to patient's satisfaction and was encouraged to call with any further questions. Total time spent for evaluation, education, and development of treatment plan: 35 minutes    Alvina Lowry [address]  8/10/2022    During this exam, Virginia PALOMO PA-C, functioned as a scribe for Dr. Althea Kendrick. The history taking and physical examination were performed by Dr. Althea Kendrick.   All counseling during the appointment was performed between the patient and Dr. Pati Morfin. 8/10/2022 3:54 PM    This dictation was performed with a verbal recognition program (DRAGON) and it was checked for errors. It is possible that there are still dictated errors within this office note. If so, please bring any areas to my attention for an addendum. All efforts were made to ensure that this office note is accurate. I attest that I met personally with the patient, performed the described exam, reviewed the radiographic studies and medical records associated with this patient and supervised the services that are described above.      Erica Ruiz MD

## 2022-08-14 ENCOUNTER — OFFICE VISIT (OUTPATIENT)
Dept: URGENT CARE | Age: 22
End: 2022-08-14
Payer: COMMERCIAL

## 2022-08-14 VITALS
SYSTOLIC BLOOD PRESSURE: 121 MMHG | OXYGEN SATURATION: 96 % | WEIGHT: 125 LBS | HEART RATE: 94 BPM | TEMPERATURE: 98.4 F | HEIGHT: 64 IN | BODY MASS INDEX: 21.34 KG/M2 | DIASTOLIC BLOOD PRESSURE: 86 MMHG

## 2022-08-14 DIAGNOSIS — M25.561 ACUTE PAIN OF RIGHT KNEE: ICD-10-CM

## 2022-08-14 DIAGNOSIS — R31.9 HEMATURIA, UNSPECIFIED TYPE: Primary | ICD-10-CM

## 2022-08-14 LAB
BACTERIA: ABNORMAL /HPF
BILIRUBIN URINE: NEGATIVE
BILIRUBIN, POC: NEGATIVE
BLOOD URINE, POC: NORMAL
BLOOD, URINE: ABNORMAL
CLARITY, POC: CLEAR
CLARITY: CLEAR
COLOR, POC: NORMAL
COLOR: YELLOW
EPITHELIAL CELLS, UA: 6 /HPF (ref 0–5)
GLUCOSE URINE, POC: NEGATIVE
GLUCOSE URINE: NEGATIVE MG/DL
HYALINE CASTS: 0 /LPF (ref 0–8)
KETONES, POC: NEGATIVE
KETONES, URINE: NEGATIVE MG/DL
LEUKOCYTE EST, POC: NORMAL
LEUKOCYTE ESTERASE, URINE: ABNORMAL
MICROSCOPIC EXAMINATION: YES
NITRITE, POC: NEGATIVE
NITRITE, URINE: NEGATIVE
PH UA: 6.5 (ref 5–8)
PH, POC: 6
PROTEIN UA: NEGATIVE MG/DL
PROTEIN, POC: NORMAL
RBC UA: 60 /HPF (ref 0–4)
SPECIFIC GRAVITY UA: 1 (ref 1–1.03)
SPECIFIC GRAVITY, POC: 1
URINE REFLEX TO CULTURE: YES
URINE TYPE: ABNORMAL
UROBILINOGEN, POC: NORMAL
UROBILINOGEN, URINE: 0.2 E.U./DL
WBC UA: 18 /HPF (ref 0–5)

## 2022-08-14 PROCEDURE — 81002 URINALYSIS NONAUTO W/O SCOPE: CPT

## 2022-08-14 PROCEDURE — 99203 OFFICE O/P NEW LOW 30 MIN: CPT

## 2022-08-14 PROCEDURE — 96372 THER/PROPH/DIAG INJ SC/IM: CPT

## 2022-08-14 PROCEDURE — 81003 URINALYSIS AUTO W/O SCOPE: CPT

## 2022-08-14 RX ORDER — METHOCARBAMOL 500 MG/1
500 TABLET, FILM COATED ORAL 4 TIMES DAILY
Qty: 40 TABLET | Refills: 0 | Status: SHIPPED | OUTPATIENT
Start: 2022-08-14 | End: 2022-08-24

## 2022-08-14 RX ORDER — METHYLPREDNISOLONE 4 MG/1
TABLET ORAL
Qty: 1 KIT | Refills: 0 | Status: SHIPPED | OUTPATIENT
Start: 2022-08-14 | End: 2022-08-24

## 2022-08-14 RX ORDER — DEXAMETHASONE SODIUM PHOSPHATE 4 MG/ML
8 INJECTION, SOLUTION INTRA-ARTICULAR; INTRALESIONAL; INTRAMUSCULAR; INTRAVENOUS; SOFT TISSUE ONCE
Status: COMPLETED | OUTPATIENT
Start: 2022-08-14 | End: 2022-08-14

## 2022-08-14 RX ADMIN — DEXAMETHASONE SODIUM PHOSPHATE 8 MG: 4 INJECTION, SOLUTION INTRA-ARTICULAR; INTRALESIONAL; INTRAMUSCULAR; INTRAVENOUS; SOFT TISSUE at 17:00

## 2022-08-14 NOTE — PATIENT INSTRUCTIONS
Topical Voltaren for relief  Obtain rest.  Take medications as prescribed. Discussed medication side effects. Take OTC pain relievers as needed. Avoid heavy lifting. Stay as active as possible without causing more pain. Use ice for first for 20-30 minutes every 2 hours. Discussed precautions and indications for returning to clinic. Discussed precautions and indications for seeking ED care.    Follow up with Ortho tomorrow

## 2022-08-14 NOTE — PROGRESS NOTES
Haritha Raman (: 2000) is a 25 y.o. female here for evaluation of the following chief complaint(s):  Leg Pain and Urinary Tract Infection      SUBJECTIVE:  HPI  Pt presents today with c/o history of right knee/shin pain that she is currently under the care of a local orthopedic physician for. Pt states she has a brace she has been actively wearing, along with ice ,rest and crutches. She has also been taking medications as prescribed including NSAIDS and muscle relaxer's. She notes the pain has not gotten any better and she noticed today that she has blood in her urine. Pt notes she notified the her physician, whom recommended she be evaluated in urgent care. Review of Systems   Constitutional: Negative. HENT: Negative. Respiratory: Negative. Cardiovascular: Negative. Gastrointestinal: Negative. Genitourinary:  Positive for hematuria. Negative for dysuria, flank pain, frequency, menstrual problem, pelvic pain, urgency and vaginal pain. Musculoskeletal:  Positive for arthralgias and myalgias. Skin: Negative. Neurological: Negative. OBJECTIVE:  /86   Pulse 94   Temp 98.4 °F (36.9 °C)   Ht 5' 4\" (1.626 m)   Wt 125 lb (56.7 kg)   LMP 2022   SpO2 96%   BMI 21.46 kg/m²    Physical Exam  Vitals reviewed. Constitutional:       Appearance: Normal appearance. She is normal weight. HENT:      Head: Normocephalic. Cardiovascular:      Rate and Rhythm: Normal rate and regular rhythm. Pulses: Normal pulses. Heart sounds: Normal heart sounds. Pulmonary:      Effort: Pulmonary effort is normal.      Breath sounds: Normal breath sounds. Abdominal:      General: Bowel sounds are normal.      Palpations: Abdomen is soft. Tenderness: There is no right CVA tenderness or left CVA tenderness. Musculoskeletal:      Right knee: Bony tenderness present. Decreased range of motion. Tenderness present over the medial joint line.         Legs:

## 2022-08-15 ENCOUNTER — TELEPHONE (OUTPATIENT)
Dept: ORTHOPEDIC SURGERY | Age: 22
End: 2022-08-15

## 2022-08-15 ENCOUNTER — OFFICE VISIT (OUTPATIENT)
Dept: ORTHOPEDIC SURGERY | Age: 22
End: 2022-08-15
Payer: COMMERCIAL

## 2022-08-15 DIAGNOSIS — R31.9 HEMATURIA, UNSPECIFIED TYPE: Primary | ICD-10-CM

## 2022-08-15 DIAGNOSIS — M79.604 PAIN OF RIGHT LOWER EXTREMITY: Primary | ICD-10-CM

## 2022-08-15 LAB — URINE CULTURE, ROUTINE: NORMAL

## 2022-08-15 PROCEDURE — 99203 OFFICE O/P NEW LOW 30 MIN: CPT | Performed by: ORTHOPAEDIC SURGERY

## 2022-08-15 ASSESSMENT — ENCOUNTER SYMPTOMS
GASTROINTESTINAL NEGATIVE: 1
RESPIRATORY NEGATIVE: 1

## 2022-08-15 NOTE — TELEPHONE ENCOUNTER
I received a page from Brooke's mother and spoke with both of them over the phone Sunday 8/14 afternoon. Na Schmitt has only been taking ibuprofen and muscle relaxants. Reports diclofenac never helped with her pain. Never took diclofenac and ibuprofen at the same time. Noted blood in urine for a day. Requested different pain meds to take care of knee pain. Because of blood in urine, I recommended her to go to UC/ER to get evaluated and appropriately treated that afternoon instead of waiting for Monday or Dr. Dulce Card follow up on Tuesday. Reassured her and mother that UC/ER provider would give them appropriate pain med after evaluating her knee as well once blood in urine is addressed. All their Qs answered and concerns addressed.     Timi Rooney MD

## 2022-08-15 NOTE — TELEPHONE ENCOUNTER
General Question     Subject: PATIENT HAS QUESTIONS REGARDING PAIN MEDICATION BECAUSE THERE WAS BLOOD IN HER URINE. FOOT IS SWOLLEN.  PLEASE ADVISE   Patient: Guy Maci  Contact Number: [phone]

## 2022-08-15 NOTE — TELEPHONE ENCOUNTER
ERVIN Solis spoke with patient  Colonel Paulino to Urgent care and was instructed to stop taking the Ibuprofen   Wants something else for pain , could not sleep last night   Foot gets swollen in the morning

## 2022-08-15 NOTE — PROGRESS NOTES
Chief Complaint  Follow-up (Right knee )      History of Present Illness:  Antwan Delarosa is a pleasant 25 y.o. female for follow-up of her right knee. She is approximately 10 weeks out from onset of pain which was initially posterior about the knee. She had an MRI which showed a fibular head stress reaction as well as the popliteus muscular strain. She is currently been attempting physical therapy and has been utilizing a brace and crutches for ambulation. She has also been taking muscle relaxers and anti-inflammatories as well as Tylenol with minimal pain relief. She states the pain is currently getting worse and is now focused over the lateral aspect of her entire lower leg. She also has swelling in the leg. She denies any neurologic symptoms such as numbness and tingling down into the foot, no new injury. Medical History:  Patient's medications, allergies, past medical, surgical, social and family histories were reviewed and updated as appropriate. ROS: Review of systems reviewed from Patient History Form completed today and available in the patient's chart under the Media tab. Pertinent items are noted in HPI  Review of systems reviewed from Patient History Form completed today and available in the patient's chart under the Media tab. Vital Signs:  LMP 07/31/2022       Right knee examination:    Gait: Utilizing knee immobilizer and crutches. Alignment: normal alignment. Inspection/skin: Skin is intact without erythema or ecchymosis. No gross deformity. Mild edema to the lower leg. Palpation: mild crepitus. no joint line tenderness present. Tenderness to palpation about the proximal fibula as well as lateral compartment musculature. Range of Motion: There is full range of motion. Strength: Quadriceps atrophy with decreased strength on ankle eversion plated to pain.   Intact inversion as well as plantar and dorsiflexion of the ankle and great toe extension. Effusion: No effusion or swelling present. Ligamentous stability: No cruciate or collateral ligament instability. Neurologic and vascular: Skin is warm and well-perfused. Sensation is intact to light-touch in the tibial, sural, superficial peroneal and deep peroneal distributions. Special tests: Positive squeeze test.      Radiology:       No new imaging was taken at today's visit. Assessment : Right lateral compartment muscular pain. Impression:  Encounter Diagnosis   Name Primary? Pain of right lower extremity Yes       Office Procedures:  Orders Placed This Encounter   Procedures    MRI TIBIA FIBULA RIGHT WO CONTRAST     Standing Status:   Future     Standing Expiration Date:   8/15/2023     Order Specific Question:   Reason for exam:     Answer:   MRI RIGHT TIB/FIB  SCAN KNEE TO ANKLE EVAL PROGRESSIVE LEG PAIN     Order Specific Question:   Reason for exam:     Answer:   Eusebio Ellis TO [facility]     Order Specific Question:   What is the sedation requirement? Answer:   None         Plan: Pertinent imaging was reviewed. The etiology, natural history, and treatment options for the disorder were discussed. The roles of activity medication, antiinflammatories, injections, bracing, physical therapy, and surgical interventions were all described to the patient and questions were answered. Patient initially presented 6 weeks after the onset of vague posterior knee pain, MRI diagnosed her with a fibular head stress reaction as well as popliteus muscle strain. For the past 4 weeks she has been utilizing crutches and a brace as well as attempting physical therapy with no resolution of her symptoms. Recently she has had worsening of her symptoms that are now more focused over the lateral aspect of her lower leg. Been taking several medications including anti-inflammatories and muscle relaxers with minimal relief.   At this point she should be improving from her initial diagnosis, given a newer worsening diffuse lower leg pain over the lateral compartment, she could have an injury to her interosseous membrane versus muscular strain or myositis. It is less likely that she has a nerve related injury as she has no neurologic symptoms. We would like to get an MRI of the lower leg to further evaluate this. We will see her back after MRI to discuss the results. Maral Vela is in agreement with this plan. All questions were answered to patient's satisfaction and was encouraged to call with any further questions. Total time spent for evaluation, education, and development of treatment plan: 38 minutes    This dictation was performed with a verbal recognition program (DRAGON) and it was checked for errors. It is possible that there are still dictated errors within this office note. If so, please bring any areas to my attention for an addendum. All efforts were made to ensure that this office note is accurate. I attest that I met personally with the patient, performed the described exam, reviewed the radiographic studies and medical records associated with this patient and supervised the services that are described above.      Alysa Ott MD

## 2022-08-18 ENCOUNTER — TELEPHONE (OUTPATIENT)
Dept: URGENT CARE | Age: 22
End: 2022-08-18

## 2022-08-18 NOTE — TELEPHONE ENCOUNTER
Confirmend with Pt that she does not have a UTI and to refrain from taking NSAIDS until follow up with ortho. Pt agreeable and verbalizes understanding.

## 2022-08-19 ENCOUNTER — OFFICE VISIT (OUTPATIENT)
Dept: ORTHOPEDIC SURGERY | Age: 22
End: 2022-08-19
Payer: COMMERCIAL

## 2022-08-19 VITALS — HEIGHT: 64 IN | BODY MASS INDEX: 21.34 KG/M2 | WEIGHT: 125 LBS

## 2022-08-19 DIAGNOSIS — B99.9 INFECTION: ICD-10-CM

## 2022-08-19 DIAGNOSIS — M84.363A STRESS FRACTURE OF RIGHT FIBULA, INITIAL ENCOUNTER: ICD-10-CM

## 2022-08-19 DIAGNOSIS — M79.604 PAIN OF RIGHT LOWER EXTREMITY: Primary | ICD-10-CM

## 2022-08-19 DIAGNOSIS — M79.604 PAIN OF RIGHT LOWER EXTREMITY: ICD-10-CM

## 2022-08-19 LAB
C-REACTIVE PROTEIN: <3 MG/L (ref 0–5.1)
SEDIMENTATION RATE, ERYTHROCYTE: 18 MM/HR (ref 0–20)
TOTAL CK: 49 U/L (ref 26–192)

## 2022-08-19 PROCEDURE — 99214 OFFICE O/P EST MOD 30 MIN: CPT | Performed by: ORTHOPAEDIC SURGERY

## 2022-08-19 PROCEDURE — L4361 PNEUMA/VAC WALK BOOT PRE OTS: HCPCS | Performed by: ORTHOPAEDIC SURGERY

## 2022-08-19 NOTE — PROGRESS NOTES
Chief Complaint  Follow-up (Right knee )      History of Present Illness:  Colleen Mcelroy is a pleasant 25 y.o. female coming into the office for a mri review of her right leg. Mri of the tib/fib reveal that the patient has a stress reaction,early stress fracture. it also reveals that she has a lot of swelling in her muscles. She reports that she is a little better than she was initially, but feels most of her pain at night. Her pain initially started 6 weeks ago with no known injury, but she has been on crutches and taking ibuprofen for the past 3 weeks with no relief. She denies any fevers, chills or night sweats and state that the pain is exacerbated with walking or trying to do calf raises. Patient also reports mild numbness and tingling in her foot. She is here to with her mother to discuss her mri films as well as diagnoses and treatment plan. Medical History:  Patient's medications, allergies, past medical, surgical, social and family histories were reviewed and updated as appropriate. Pertinent items are noted in HPI  Review of systems reviewed from Patient History Form dated on 08/19/2022 and available in the patient's chart under the Media tab. Vital Signs: There were no vitals filed for this visit. Neuro: Alert & oriented x 3,  normal,  no focal deficits noted. Normal affect. Eyes: sclera clear  Ears: Normal external ear  Mouth:  No perioral lesions  Pulm: Respirations unlabored and regular  Pulse: Regular rate and rhythm   Skin: Warm, well perfused      Constitutional: In no apparent distress. Normal affect. Alert and oriented X3 and is cooperative. Right knee examination:     Gait: Utilizing knee immobilizer and crutches. Alignment: normal alignment. Inspection/skin: Skin is intact without erythema or ecchymosis. No gross deformity. Mild edema to the lower leg. Palpation: mild crepitus. no joint line tenderness present.   Tenderness to palpation about the proximal fibula as well as lateral compartment musculature. Range of Motion: There is full range of motion. Strength: Quadriceps atrophy with decreased strength on ankle eversion plated to pain. Intact inversion as well as plantar and dorsiflexion of the ankle and great toe extension. Effusion: No effusion or swelling present. Ligamentous stability: No cruciate or collateral ligament instability. Neurologic and vascular: Skin is warm and well-perfused. Sensation is intact to light-touch in the tibial, sural, superficial peroneal and deep peroneal distributions. Special tests: Positive squeeze test.      left knee exam    Gait: No use of assistive devices. No antalgic gait. Alignment: normal alignment. Inspection/skin: Skin is intact without erythema or ecchymosis. No gross deformity. Palpation: no crepitus. no joint line tenderness present. Range of Motion: There is full range of motion. Strength: Normal quadriceps development. Effusion: No effusion or swelling present. Ligamentous stability: No cruciate or collateral ligament instability. Neurologic and vascular: Skin is warm and well-perfused. Sensation is intact to light-touch. Special tests: Negative Contreras sign. Radiology:     MRI RIGHT TIB / FIB 08/17/2022    CONCLUSION:   1. Edema of the muscles of the posterior calf (deep and superficial compartments) and peroneal    group. However, sparing of the anterior compartment extensors. Nerve conduction testing is    recommended. 2. Transverse stress reaction at the epiphyseal-metaphyseal junctions at both the proximal and    distal fibula. Proximally, the appearance is consistent with an early stress fracture. 3. No intramuscular hematoma, mass lesion or stress injury of the tibia. Assessment :  25 y.o female with acute severe right leg pain. Not associated with an injury. Impression:  Encounter Diagnoses   Name Primary?     Pain of right lower extremity Yes    Stress fracture of right fibula, initial encounter     Infection        Office Procedures:  Orders Placed This Encounter   Procedures    CBC with Auto Differential     Standing Status:   Future     Number of Occurrences:   1     Standing Expiration Date:   8/19/2023    Sedimentation Rate     Standing Status:   Future     Number of Occurrences:   1     Standing Expiration Date:   8/19/2023    C-Reactive Protein     Standing Status:   Future     Number of Occurrences:   1     Standing Expiration Date:   8/19/2023    CK     Standing Status:   Future     Number of Occurrences:   1     Standing Expiration Date:   8/19/2023    Benny Ortiz MD (Inpatient and Outpatient EMG), [facility]     Referral Priority:   Routine     Referral Type:   Eval and Treat     Referral Reason:   Specialty Services Required     Referred to Provider:   Yariel Mora MD     Requested Specialty:   Neurology     Number of Visits Requested:   1    Airselect Tall Pneumatic Walking Boot     Patient was prescribed a Anthony Gemma Tall Walking Boot. The right TIB/FIB will require stabilization / immobilization from this semi-rigid / rigid orthosis to improve their function. The orthosis will assist in protecting the affected area, provide functional support and facilitate healing. Patient was instructed to progress ambulation weight bearing as tolerated in the device. The patient was educated and fit by a healthcare professional with expert knowledge and specialization in brace application while under the direct supervision of the physician. Verbal and written instructions for the use of and application of this item were provided. They were instructed to contact the office immediately should the brace result in increased pain, decreased sensation, increased swelling or worsening of the condition. Plan: Pertinent imaging was reviewed.  The etiology, natural history, and treatment options for the

## 2022-08-22 ENCOUNTER — PATIENT MESSAGE (OUTPATIENT)
Dept: PRIMARY CARE CLINIC | Age: 22
End: 2022-08-22

## 2022-08-22 ENCOUNTER — HOSPITAL ENCOUNTER (OUTPATIENT)
Dept: PHYSICAL THERAPY | Age: 22
Discharge: HOME | End: 2022-08-22

## 2022-08-22 NOTE — TELEPHONE ENCOUNTER
----- Message from Rainerbetty Ortiz sent at 8/22/2022  8:11 AM EDT -----  Subject: Appointment Request    Reason for Call: Established Patient Appointment needed: Routine Existing   Condition Follow Up    QUESTIONS    Reason for appointment request? Available appointments did not meet   patient need     Additional Information for Provider? patient requesting to be seen today   by anyone for a referral to rheumatology.  was to follow up with a PCP   after seeing ortho.   ---------------------------------------------------------------------------  --------------  Gardenia Eckert [facility]  [phone]; OK to leave message on voicemail  ---------------------------------------------------------------------------  --------------  SCRIPT ANSWERS  ROSARIOID Screen: Eddie Cain

## 2022-08-22 NOTE — FLOWSHEET NOTE
Physical Therapy  Cancellation/No-show Note  Patient Name:  Becky Orourke  :  2000   Date:  2022  Cancelled visits to date: 1  No-shows to date: 0    For today's appointment patient:  [x]  Cancelled  []  Rescheduled appointment  []  No-show     Reason given by patient:  []  Patient ill  []  Conflicting appointment  []  No transportation    []  Conflict with work  []  No reason given  [x]  Other:  no longer wants PT   Comments:      Electronically signed by:  Ana Hammond, PT, PT

## 2022-08-23 LAB
ANISOCYTOSIS: ABNORMAL
BASOPHILS ABSOLUTE: 0.1 K/UL (ref 0–0.2)
BASOPHILS RELATIVE PERCENT: 0.5 %
CRENATED RBC'S: ABNORMAL
EOSINOPHILS ABSOLUTE: 0 K/UL (ref 0–0.6)
EOSINOPHILS RELATIVE PERCENT: 0.3 %
HCT VFR BLD CALC: 38 % (ref 36–48)
HEMATOLOGY PATH CONSULT: NO
HEMOGLOBIN: 11.8 G/DL (ref 12–16)
LYMPHOCYTES ABSOLUTE: 2.1 K/UL (ref 1–5.1)
LYMPHOCYTES RELATIVE PERCENT: 16.4 %
MCH RBC QN AUTO: 19.6 PG (ref 26–34)
MCHC RBC AUTO-ENTMCNC: 31 G/DL (ref 31–36)
MCV RBC AUTO: 63.3 FL (ref 80–100)
MICROCYTES: ABNORMAL
MONOCYTES ABSOLUTE: 0.4 K/UL (ref 0–1.3)
MONOCYTES RELATIVE PERCENT: 3.2 %
NEUTROPHILS ABSOLUTE: 10.3 K/UL (ref 1.7–7.7)
NEUTROPHILS RELATIVE PERCENT: 79.6 %
OVALOCYTES: ABNORMAL
PDW BLD-RTO: 14.7 % (ref 12.4–15.4)
PLATELET # BLD: 443 K/UL (ref 135–450)
PMV BLD AUTO: 7.9 FL (ref 5–10.5)
POIKILOCYTES: ABNORMAL
RBC # BLD: 6 M/UL (ref 4–5.2)
SCHISTOCYTES: ABNORMAL
SLIDE REVIEW: ABNORMAL
TEAR DROP CELLS: ABNORMAL
WBC # BLD: 12.9 K/UL (ref 4–11)

## 2022-08-24 ENCOUNTER — OFFICE VISIT (OUTPATIENT)
Dept: PRIMARY CARE CLINIC | Age: 22
End: 2022-08-24
Payer: COMMERCIAL

## 2022-08-24 VITALS
OXYGEN SATURATION: 99 % | BODY MASS INDEX: 21.37 KG/M2 | HEART RATE: 93 BPM | HEIGHT: 64 IN | TEMPERATURE: 97.5 F | DIASTOLIC BLOOD PRESSURE: 70 MMHG | WEIGHT: 125.2 LBS | SYSTOLIC BLOOD PRESSURE: 110 MMHG | RESPIRATION RATE: 18 BRPM

## 2022-08-24 DIAGNOSIS — M79.604 PAIN OF RIGHT LOWER EXTREMITY: Primary | ICD-10-CM

## 2022-08-24 DIAGNOSIS — R26.2 DIFFICULTY IN WALKING: ICD-10-CM

## 2022-08-24 PROCEDURE — 99214 OFFICE O/P EST MOD 30 MIN: CPT | Performed by: FAMILY MEDICINE

## 2022-08-24 SDOH — ECONOMIC STABILITY: FOOD INSECURITY: WITHIN THE PAST 12 MONTHS, THE FOOD YOU BOUGHT JUST DIDN'T LAST AND YOU DIDN'T HAVE MONEY TO GET MORE.: NEVER TRUE

## 2022-08-24 SDOH — ECONOMIC STABILITY: FOOD INSECURITY: WITHIN THE PAST 12 MONTHS, YOU WORRIED THAT YOUR FOOD WOULD RUN OUT BEFORE YOU GOT MONEY TO BUY MORE.: NEVER TRUE

## 2022-08-24 ASSESSMENT — ENCOUNTER SYMPTOMS
ABDOMINAL PAIN: 0
GASTROINTESTINAL NEGATIVE: 1
RESPIRATORY NEGATIVE: 1
EYES NEGATIVE: 1

## 2022-08-24 ASSESSMENT — PATIENT HEALTH QUESTIONNAIRE - PHQ9
SUM OF ALL RESPONSES TO PHQ QUESTIONS 1-9: 0
1. LITTLE INTEREST OR PLEASURE IN DOING THINGS: 0
2. FEELING DOWN, DEPRESSED OR HOPELESS: 0
SUM OF ALL RESPONSES TO PHQ9 QUESTIONS 1 & 2: 0
SUM OF ALL RESPONSES TO PHQ QUESTIONS 1-9: 0

## 2022-08-24 ASSESSMENT — SOCIAL DETERMINANTS OF HEALTH (SDOH): HOW HARD IS IT FOR YOU TO PAY FOR THE VERY BASICS LIKE FOOD, HOUSING, MEDICAL CARE, AND HEATING?: NOT HARD AT ALL

## 2022-08-24 NOTE — PROGRESS NOTES
SUBJECTIVE:  Patient ID: Laura Morataya is a 25 y.o. female. 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  RT Leg pain x 2 month gradual get worth  No Hx Injury  Difficult to walk  Ortho evaluation  MRI x 2 +Muscle edema   NSAID x 4 week cause blood in urine   Prednisone did help a lot   Told inflammation  Neurology mukesh 8/27/2022 [place]    Past Medical History:   Diagnosis Date    Premature baby     33 weeks  weight @Birth 4 Lb 12 oz     Past Surgical History:   Procedure Laterality Date    ARM SURGERY Left Age 9    WISDOM TOOTH EXTRACTION  2016     Allergies   Allergen Reactions    Nsaids      Blood in urine         Patient Active Problem List   Diagnosis    Thalassemia minor     Current Outpatient Medications   Medication Sig Dispense Refill    KURVELO 0.15-30 MG-MCG per tablet TK 1 T PO QD      tretinoin (RETIN-A) 0.025 % cream Apply topically nightly Apply topically nightly. Clindamycin Phos-Benzoyl Perox (ONEXTON) 1.2-3.75 % GEL Apply topically      methylPREDNISolone (MEDROL DOSEPACK) 4 MG tablet Take by mouth and taper according to package instructions (Patient not taking: Reported on 8/24/2022) 1 kit 0    methocarbamol (ROBAXIN) 500 MG tablet Take 1 tablet by mouth in the morning and 1 tablet at noon and 1 tablet in the evening and 1 tablet before bedtime. Do all this for 10 days. (Patient not taking: Reported on 8/24/2022) 40 tablet 0    diclofenac (VOLTAREN) 75 MG EC tablet Take 1 tablet by mouth in the morning and 1 tablet before bedtime. (Patient not taking: Reported on 8/24/2022) 60 tablet 3    fluticasone (FLONASE) 50 MCG/ACT nasal spray 1 spray by Each Nostril route in the morning for 7 days. 1 each 1     No current facility-administered medications for this visit.      Lab Results   Component Value Date    WBC 12.9 (H) 08/19/2022    HGB 11.8 (L) 08/19/2022    HCT 38.0 08/19/2022    MCV 63.3 (L) 08/19/2022     08/19/2022     No results found for: CHOL  No results found for: TRIG  No results found for: HDL  No results found for: LDLCHOLESTEROL, LDLCALC  No results found for: LABVLDL, VLDL  No results found for: [facility]    Chemistry        Component Value Date/Time     02/26/2021 1337    K 4.5 02/26/2021 1337     02/26/2021 1337    CO2 24 02/26/2021 1337    BUN 9 02/26/2021 1337    CREATININE 0.6 02/26/2021 1337        Component Value Date/Time    CALCIUM 9.5 02/26/2021 1337    ALKPHOS 71 02/26/2021 1337    AST 26 02/26/2021 1337    ALT 17 02/26/2021 1337    BILITOT <0.2 02/26/2021 1337            Review of Systems   Constitutional:  Negative for chills, diaphoresis and fever. HENT: Negative. Eyes: Negative. Respiratory: Negative. Cardiovascular: Negative. Gastrointestinal: Negative. Negative for abdominal pain. Endocrine: Negative. Genitourinary:  Negative for dysuria and flank pain. Cycle regular   Last 8/1  BCP   Musculoskeletal:         Rt Leg pain around knee & medial part  Hard to walk   Skin:  Negative for rash. Neurological:  Negative for dizziness, tremors, numbness and headaches. Hematological: Negative. Psychiatric/Behavioral: Negative. OBJECTIVE:  /70 (Site: Right Upper Arm, Position: Sitting, Cuff Size: Medium Adult)   Pulse 93   Temp 97.5 °F (36.4 °C) (Infrared)   Resp 18   Ht 5' 4\" (1.626 m)   Wt 125 lb 3.2 oz (56.8 kg)   LMP 07/31/2022   SpO2 99%   BMI 21.49 kg/m²   Physical Exam  HENT:      Head: Normocephalic. Eyes:      Extraocular Movements: Extraocular movements intact. Pupils: Pupils are equal, round, and reactive to light. Cardiovascular:      Rate and Rhythm: Normal rate and regular rhythm. Pulses: Normal pulses. Heart sounds: Normal heart sounds. Pulmonary:      Effort: Pulmonary effort is normal.      Breath sounds: Normal breath sounds.    Musculoskeletal:      Cervical back: Normal range of motion and neck supple. Comments: RT knee No swelling ,no erythema  Walk with slight limp  Limited knee flexion   Neurological:      General: No focal deficit present. Mental Status: She is alert and oriented to person, place, and time. ASSESSMENT/PLAN:      Diagnosis Orders   1. Pain of right lower extremity  Marta Hurd MD, Rheumatology, [facility] Extremity Venous Bilateral    CBC with Auto Differential    Comprehensive Metabolic Panel    Sedimentation Rate    C-Reactive Protein    RHEUMATOID FACTOR    ELOY profile    TSH      2.  Difficulty in walking  Cheryl January, Jhon Andujar MD, Rheumatology, [facility] Extremity Venous Bilateral    TSH        Order as above  Pt has Neurology visit 8/27/2022 [place]  Venous doppler

## 2022-08-25 ENCOUNTER — HOSPITAL ENCOUNTER (OUTPATIENT)
Dept: VASCULAR LAB | Age: 22
Discharge: HOME | End: 2022-08-25
Payer: COMMERCIAL

## 2022-08-25 PROCEDURE — 93970 EXTREMITY STUDY: CPT

## 2022-08-30 ENCOUNTER — OFFICE VISIT (OUTPATIENT)
Dept: ORTHOPEDIC SURGERY | Age: 22
End: 2022-08-30
Payer: COMMERCIAL

## 2022-08-30 VITALS — WEIGHT: 125 LBS | BODY MASS INDEX: 21.34 KG/M2 | HEIGHT: 64 IN

## 2022-08-30 DIAGNOSIS — M79.604 PAIN OF RIGHT LOWER EXTREMITY: Primary | ICD-10-CM

## 2022-08-30 DIAGNOSIS — M54.10 RADICULOPATHY, UNSPECIFIED SPINAL REGION: ICD-10-CM

## 2022-08-30 DIAGNOSIS — M84.363A STRESS FRACTURE OF RIGHT FIBULA, INITIAL ENCOUNTER: ICD-10-CM

## 2022-08-30 PROCEDURE — 99214 OFFICE O/P EST MOD 30 MIN: CPT | Performed by: ORTHOPAEDIC SURGERY

## 2022-08-30 NOTE — PROGRESS NOTES
Follow-up (Right knee. Mri / emg results )      History of Present Illness:  Norbert Pope is a 25 y.o. female here for follow-up of her right leg. Since her last visit her right leg pain has improved after taking the Medrol Dosepak. It is now at a 4 out of 10. She has had several tests since her last visit as well including CBC, ESR, CRP and CPK as well as an EMG. Medical History:  Patient's medications, allergies, past medical, surgical, social and family histories were reviewed and updated as appropriate. Pertinent items are noted in HPI  Review of systems reviewed from Patient History Form completed today and available in the patient's chart under the Media tab. Vital Signs: There were no vitals filed for this visit. Examination of the right lower extremity:  Inspection shows skin to be intact with no discoloration. Her calf circumferences equal to the contralateral side. She has good strength in the lower extremity. Reflexes are normal.  Negative straight leg test.      Radiology:       No new imaging was taken at today's visit      Assessment :  25 y.o. female with right leg pain associated with possible neurogenic cause. Impression:  Encounter Diagnoses   Name Primary? Pain of right lower extremity Yes    Stress fracture of right fibula, initial encounter     Radiculopathy, unspecified spinal region        Office Procedures:  Orders Placed This Encounter   Procedures    Driss Ashley MD, Neurology, [facility]     Referral Priority:   Routine     Referral Type:   Eval and Treat     Referral Reason:   Specialty Services Required     Referred to Provider:   Andrea Nayak MD     Requested Specialty:   Neurology     Number of Visits Requested:   1           Plan: Pertinent imaging was reviewed. The etiology, natural history, and treatment options for the disorder were discussed.   The roles of activity medication, antiinflammatories, injections, bracing, physical therapy, and surgical interventions were all described to the patient and questions were answered. Patient comes in today to follow-up with laboratory like tests as well as an EMG. Her pain is significantly improved is now 4 out of 10 after a Medrol Dosepak. Her laboratory results show an abnormality with her CBC due to her thalassemia and her white blood cell count is very minimally elevated however the ESR CRP and CPK are normal.  Her EMG test does reveal possible L4-L5 myotomal denervation. At this point I think her issue is most likely a neurogenic cause and we will get her a referral to a neurologist to further evaluate her. Leah Johnson is in agreement with this plan. All questions were answered to patient's satisfaction and was encouraged to call with any further questions. Total time spent for evaluation, education, and development of treatment plan: 39 minutes    Agata Self MD    This dictation was performed with a verbal recognition program [facility]) and it was checked for errors. It is possible that there are still dictated errors within this office note. If so, please bring any areas to my attention for an addendum. All efforts were made to ensure that this office note is accurate. I attest that I met personally with the patient, performed the described exam, reviewed the radiographic studies and medical records associated with this patient and supervised the services that are described above.      Eliazar Ricardo MD

## 2022-08-31 ENCOUNTER — TELEPHONE (OUTPATIENT)
Dept: PRIMARY CARE CLINIC | Age: 22
End: 2022-08-31

## 2022-08-31 DIAGNOSIS — R26.2 DIFFICULTY IN WALKING: ICD-10-CM

## 2022-08-31 DIAGNOSIS — M79.604 PAIN OF RIGHT LOWER EXTREMITY: ICD-10-CM

## 2022-08-31 LAB
A/G RATIO: 1.7 (ref 1.1–2.2)
ALBUMIN SERPL-MCNC: 4.5 G/DL (ref 3.4–5)
ALP BLD-CCNC: 64 U/L (ref 40–129)
ALT SERPL-CCNC: 14 U/L (ref 10–40)
ANION GAP SERPL CALCULATED.3IONS-SCNC: 11 MMOL/L (ref 3–16)
ANTI-NUCLEAR ANTIBODY (ANA): POSITIVE
AST SERPL-CCNC: 18 U/L (ref 15–37)
BILIRUB SERPL-MCNC: 0.3 MG/DL (ref 0–1)
BUN BLDV-MCNC: 10 MG/DL (ref 7–20)
C-REACTIVE PROTEIN: <3 MG/L (ref 0–5.1)
CALCIUM SERPL-MCNC: 9.8 MG/DL (ref 8.3–10.6)
CHLORIDE BLD-SCNC: 104 MMOL/L (ref 99–110)
CO2: 25 MMOL/L (ref 21–32)
CREAT SERPL-MCNC: 0.6 MG/DL (ref 0.6–1.1)
GFR AFRICAN AMERICAN: >60
GFR NON-AFRICAN AMERICAN: >60
GLUCOSE BLD-MCNC: 94 MG/DL (ref 70–99)
POTASSIUM SERPL-SCNC: 4.6 MMOL/L (ref 3.5–5.1)
RHEUMATOID FACTOR: <10 IU/ML
SEDIMENTATION RATE, ERYTHROCYTE: 12 MM/HR (ref 0–20)
SODIUM BLD-SCNC: 140 MMOL/L (ref 136–145)
TOTAL PROTEIN: 7.1 G/DL (ref 6.4–8.2)
TSH SERPL DL<=0.05 MIU/L-ACNC: 2 UIU/ML (ref 0.27–4.2)

## 2022-08-31 NOTE — TELEPHONE ENCOUNTER
Pt called back stating that she would like an MRI from [facility] and can try  both referrals that you put in. You can call back later today if you need to.

## 2022-09-01 ENCOUNTER — TELEPHONE (OUTPATIENT)
Dept: PRIMARY CARE CLINIC | Age: 22
End: 2022-09-01

## 2022-09-01 ENCOUNTER — TELEPHONE (OUTPATIENT)
Dept: ORTHOPEDIC SURGERY | Age: 22
End: 2022-09-01

## 2022-09-01 LAB
BASOPHILS ABSOLUTE: 0 K/UL (ref 0–0.2)
BASOPHILS RELATIVE PERCENT: 0.6 %
EOSINOPHILS ABSOLUTE: 0.3 K/UL (ref 0–0.6)
EOSINOPHILS RELATIVE PERCENT: 4.1 %
HCT VFR BLD CALC: 34.4 % (ref 36–48)
HEMATOLOGY PATH CONSULT: NO
HEMOGLOBIN: 10.9 G/DL (ref 12–16)
LYMPHOCYTES ABSOLUTE: 2 K/UL (ref 1–5.1)
LYMPHOCYTES RELATIVE PERCENT: 30.8 %
MCH RBC QN AUTO: 19.9 PG (ref 26–34)
MCHC RBC AUTO-ENTMCNC: 31.8 G/DL (ref 31–36)
MCV RBC AUTO: 62.6 FL (ref 80–100)
MONOCYTES ABSOLUTE: 0.4 K/UL (ref 0–1.3)
MONOCYTES RELATIVE PERCENT: 6.6 %
NEUTROPHILS ABSOLUTE: 3.8 K/UL (ref 1.7–7.7)
NEUTROPHILS RELATIVE PERCENT: 57.9 %
PDW BLD-RTO: 14.9 % (ref 12.4–15.4)
PLATELET # BLD: 332 K/UL (ref 135–450)
PMV BLD AUTO: 8.2 FL (ref 5–10.5)
RBC # BLD: 5.5 M/UL (ref 4–5.2)
WBC # BLD: 6.5 K/UL (ref 4–11)

## 2022-09-01 NOTE — TELEPHONE ENCOUNTER
Pt mom, Roopa Ellison called and wants a MRI   For pt to be ordered for nerves in spine. Pt has an OV on 9/2/22. Pls call pt back.

## 2022-09-01 NOTE — TELEPHONE ENCOUNTER
Patient needs a rheumatology referral - outside of [facility] - [place] - no opportunities until January  ELOY results came back positive  - needs a referral soon   [facility] or   is preferred.       Patient is also requesting a Neurologist referral  (The Neurologists referred by Dr Fernando Burnett was unable to help.)

## 2022-09-02 ENCOUNTER — OFFICE VISIT (OUTPATIENT)
Dept: PRIMARY CARE CLINIC | Age: 22
End: 2022-09-02
Payer: COMMERCIAL

## 2022-09-02 ENCOUNTER — TELEPHONE (OUTPATIENT)
Dept: RHEUMATOLOGY | Age: 22
End: 2022-09-02

## 2022-09-02 VITALS
TEMPERATURE: 98.6 F | SYSTOLIC BLOOD PRESSURE: 112 MMHG | WEIGHT: 125.6 LBS | HEART RATE: 97 BPM | OXYGEN SATURATION: 98 % | DIASTOLIC BLOOD PRESSURE: 78 MMHG | BODY MASS INDEX: 21.56 KG/M2

## 2022-09-02 DIAGNOSIS — R29.898 WEAKNESS OF RIGHT LOWER EXTREMITY: ICD-10-CM

## 2022-09-02 DIAGNOSIS — R76.8 ANA POSITIVE: ICD-10-CM

## 2022-09-02 DIAGNOSIS — M79.604 LEG PAIN, ANTERIOR, RIGHT: Primary | ICD-10-CM

## 2022-09-02 DIAGNOSIS — D56.3 THALASSEMIA MINOR: ICD-10-CM

## 2022-09-02 PROCEDURE — 99214 OFFICE O/P EST MOD 30 MIN: CPT | Performed by: FAMILY MEDICINE

## 2022-09-02 ASSESSMENT — PATIENT HEALTH QUESTIONNAIRE - PHQ9
1. LITTLE INTEREST OR PLEASURE IN DOING THINGS: 0
SUM OF ALL RESPONSES TO PHQ QUESTIONS 1-9: 0
2. FEELING DOWN, DEPRESSED OR HOPELESS: 0
SUM OF ALL RESPONSES TO PHQ QUESTIONS 1-9: 0
SUM OF ALL RESPONSES TO PHQ QUESTIONS 1-9: 0
SUM OF ALL RESPONSES TO PHQ9 QUESTIONS 1 & 2: 0
SUM OF ALL RESPONSES TO PHQ QUESTIONS 1-9: 0

## 2022-09-02 ASSESSMENT — ENCOUNTER SYMPTOMS
RESPIRATORY NEGATIVE: 1
GASTROINTESTINAL NEGATIVE: 1
EYES NEGATIVE: 1

## 2022-09-02 NOTE — PROGRESS NOTES
[facility]    Chemistry        Component Value Date/Time     08/31/2022 0951    K 4.6 08/31/2022 0951     08/31/2022 0951    CO2 25 08/31/2022 0951    BUN 10 08/31/2022 0951    CREATININE 0.6 08/31/2022 0951        Component Value Date/Time    CALCIUM 9.8 08/31/2022 0951    ALKPHOS 64 08/31/2022 0951    AST 18 08/31/2022 0951    ALT 14 08/31/2022 0951    BILITOT 0.3 08/31/2022 0951            Review of Systems   Constitutional:  Negative for diaphoresis and fever. HENT: Negative. Eyes: Negative. Respiratory: Negative. Cardiovascular: Negative. Gastrointestinal: Negative. Endocrine: Negative. Musculoskeletal:         RT leg weakness   Neurological:         Rt leg weakness     OBJECTIVE:  /78 (Site: Right Upper Arm, Position: Sitting, Cuff Size: Medium Adult)   Pulse 97   Temp 98.6 °F (37 °C) (Infrared)   Wt 125 lb 9.6 oz (57 kg)   LMP 08/29/2022   SpO2 98%   BMI 21.56 kg/m²   Physical Exam  Constitutional:       Comments: In with father   Vini Motta:      Head: Normocephalic. Musculoskeletal:      Comments: Rt leg weakness   Neurological:      General: No focal deficit present. Mental Status: She is alert. ASSESSMENT/PLAN:      Diagnosis Orders   1. Leg pain, anterior, right  MRI LUMBAR SPINE WO CONTRAST    MARY ALICE Joreg MD, Neurology, [facility]      2. Weakness of right lower extremity  MARY ALICE Jorge MD, Neurology, [facility]      3. ELOY positive        4.  Thalassemia minor          Rheumatology Referral given 8/24/2022  Neurology Referral  Pt was seen by Ortho Dr dEgar Vicente 8/30/2022

## 2022-09-02 NOTE — TELEPHONE ENCOUNTER
Received communication that this patient is in need to be seen sooner than the first available in Jan with all locations. Patient see's Zara oClon in [place]. Requesting something in Sept or October. May I use 2 follow up slots to accommodate this patients need.

## 2022-09-03 LAB
ANA PATTERN: ABNORMAL
ANA TITER: ABNORMAL
ANTINUCLEAR AB INTERPRETIVE COMMENT: ABNORMAL
ANTINUCLEAR ANTIBODY, HEP-2, IGG: DETECTED

## 2022-09-05 NOTE — TELEPHONE ENCOUNTER
Received request from Dr. Andrew Arriaga for pt to be seen urgently this week. I am out of the office until next Tuesday. Please check with the other rheumatologists in our group to see if pt can be added on this wk.

## 2022-09-07 ENCOUNTER — OFFICE VISIT (OUTPATIENT)
Dept: RHEUMATOLOGY | Age: 22
End: 2022-09-07
Payer: COMMERCIAL

## 2022-09-07 VITALS
TEMPERATURE: 98.4 F | HEART RATE: 100 BPM | DIASTOLIC BLOOD PRESSURE: 70 MMHG | SYSTOLIC BLOOD PRESSURE: 108 MMHG | WEIGHT: 127 LBS | BODY MASS INDEX: 21.8 KG/M2 | OXYGEN SATURATION: 98 %

## 2022-09-07 DIAGNOSIS — M79.604 PAIN OF RIGHT LOWER EXTREMITY: ICD-10-CM

## 2022-09-07 DIAGNOSIS — M54.16 LUMBAR RADICULOPATHY: ICD-10-CM

## 2022-09-07 DIAGNOSIS — R76.8 POSITIVE ANA (ANTINUCLEAR ANTIBODY): ICD-10-CM

## 2022-09-07 DIAGNOSIS — R76.8 POSITIVE ANA (ANTINUCLEAR ANTIBODY): Primary | ICD-10-CM

## 2022-09-07 PROCEDURE — 99205 OFFICE O/P NEW HI 60 MIN: CPT | Performed by: INTERNAL MEDICINE

## 2022-09-07 NOTE — PROGRESS NOTES
SUBJECTIVE:    Patient ID: Antwan Delarosa is a 25 y.o. female. This 80-year-old woman is sent for consultation by Dr. Cinthia Beltrán because of a positive ELOY and pain in the right knee and right ankle which was present for several weeks but was recently resolved. Initially she was treated with an anti-inflammatory but had to discontinue the medication a because of blood in her urine nd more recently has just use Tylenol. He denies a history of back pain but she did have morning stiffness lasting about an hour in the affected limb. Family history is negative for rheumatoid or lupus. Non-smoker drinks rarely she is on birth control pills. 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 but she is unaware of anyone having B19 infection    Review of Systems:    Constitutional symptoms: denies weight loss, fevers night sweats  Eyes: denies dry eyes, denies visual loss  Ears, nose, mouth, throat: denies mucosal sores denies dry mouth  Cardiovascular: denies pleuritic chest pain  Respiratory: denies cough, denies shortness of breath  Gastrointestinal: denies peptic ulcer disease,denies difficulty swallowing  Skin: denies photosensitivity, denies rash, denies alopecia  Neurologic: Abnormal EMG suggestive of right sided radiculopathy  Hematologic: denies blood clots  Genitourinary: History of hematuria  Musculoskeletal: denies Raynaud's, denies joint swelling    Prior to Visit Medications    Medication Sig Taking? Authorizing Provider   Courtney Johnson 0.15-30 MG-MCG per tablet TK 1 T PO QD Yes Historical Provider, MD   tretinoin (RETIN-A) 0.025 % cream Apply topically nightly Apply topically nightly. Yes Historical Provider, MD   Clindamycin Phos-Benzoyl Perox (ONEXTON) 1.2-3.75 % GEL Apply topically Yes Historical Provider, MD   fluticasone (FLONASE) 50 MCG/ACT nasal spray 1 spray by Each Nostril route in the morning for 7 days.   Óscar Maciasster, APRN - CNP        OBJECTIVE:  /70   Pulse 100   Temp 98.4 °F (36.9 °C)   Wt 127 lb (57.6 kg)   LMP 08/29/2022   SpO2 98%   BMI 21.80 kg/m²      Physical Exam:   General: Normal gait and posture. Hygiene appears normal.  No evidence of overt muscle wasting. Eye: inspection of the conjunctiva and eyelids reveals no evidence of abnormal injection or discharge. Examination of the pupils shows that they react equally and consensually to light and to accommodation. The irises appear normal.  Fundi are grossly benign. Ears, nose, mouth, throat: external inspection of the ears and nose reveals no evidence of asymmetry mass or other abnormality. Otoscopic examination of the external auditory canals and tympanic membranes reveals no obstruction mass or other abnormal finding. The tympanic membranes are clear without evidence of injection. Visualized structures within the middle ear are normal.  There are no mucosal sores. Neck: examination of the neck reveals no evidence of mass asymmetry or crepitus. The trachea is midline. Examination of the thyroid shows no evidence of enlargement tenderness or mass. Respiratory: the patient's respiratory effort shows normal respiration without evidence of the use of accessory muscles. Diaphragmatic movement is normal.  There is no evidence of intercostal retractions. Percussion of the chest revealed no evidence of dullness flatness or hyperresonance. Auscultation revealed normal breath sounds in all lung fields. They worsen no evidence of abnormal sounds such as rales or wheezes. There was no evidence of a friction rub. Cardiovascular: palpation of the patient's chest revealed no evidence of abnormal thrill. Point of maximal impulse showed no displacement. Auscultation of the heart revealed no evidence of murmur gallop or other abnormal sound. Examination of the carotid arteries revealed no evidence of bruit, abnormal amplitude or abnormal pulsation.   Gastrointestinal: the examination of the patient's abdomen showed no evidence of mass or tenderness. The liver and spleen reveal no evidence of enlargement. Musculoskeletal: No synovitis ankles no synovitis knees range of motion hips full no synovitis small joints of the hands and wrist.      Neurologic: cranial nerves two through 12 are grossly intact. Reflexes were equal bilaterally. Skin: no rashes    ASSESSMENT: Positive ELOY   lumbar radiculopathy hematuria       PLAN: There is very little to suggest inflammatory connective tissue disease based on the patient's history. An ELOY with cascade will be performed. We will also check Parvovirus B19 because of her exposure to young children over the summer. Lastly an ANCA will be checked because of the history of hematuria to look for evidence of vasculitis. The patient will contact the office next week to discuss the results.

## 2022-09-08 LAB
ANTI-CENTROMERE B IGG: 2.5 AI (ref 0–0.9)
ANTI-CHROMATIN IGG: <0.2 AI (ref 0–0.9)
ANTI-DSDNA IGG: <1 IU/ML (ref 0–9)
ANTI-JO1 IGG: <0.2 AI (ref 0–0.9)
ANTI-NUCLEAR ANTIBODY (ANA): POSITIVE
ANTI-RIBOSOMAL P IGG: <0.2 AI (ref 0–0.9)
ANTI-RNP IGG: <0.2 AI (ref 0–0.9)
ANTI-SCL70 IGG: <0.2 AI (ref 0–0.9)
ANTI-SMITH IGG: <0.2 AI (ref 0–0.9)
ANTI-SMRNP IGG: <0.2 AI (ref 0–0.9)
ANTI-SS-A IGG: <0.2 AI (ref 0–0.9)
ANTI-SS-B IGG: <0.2 AI (ref 0–0.9)

## 2022-09-09 LAB
MYELOPEROXIDASE AB: 0 AU/ML (ref 0–19)
PARVOVIRUS B19 IGG ANTIBODY: 10.29 IV
PARVOVIRUS B19 IGM ANTIBODY: 0.2 IV
SERINE PROTEASE 3 AB: 1 AU/ML (ref 0–19)

## 2022-09-27 ENCOUNTER — TELEMEDICINE (OUTPATIENT)
Dept: PRIMARY CARE CLINIC | Age: 22
End: 2022-09-27
Payer: COMMERCIAL

## 2022-09-27 DIAGNOSIS — J01.90 ACUTE NON-RECURRENT SINUSITIS, UNSPECIFIED LOCATION: Primary | ICD-10-CM

## 2022-09-27 PROCEDURE — 99213 OFFICE O/P EST LOW 20 MIN: CPT | Performed by: NURSE PRACTITIONER

## 2022-09-27 RX ORDER — AMOXICILLIN AND CLAVULANATE POTASSIUM 875; 125 MG/1; MG/1
1 TABLET, FILM COATED ORAL 2 TIMES DAILY
Qty: 20 TABLET | Refills: 0 | Status: SHIPPED | OUTPATIENT
Start: 2022-09-27 | End: 2022-10-07

## 2022-09-27 ASSESSMENT — ENCOUNTER SYMPTOMS
SHORTNESS OF BREATH: 0
COUGH: 0
NAUSEA: 0
WHEEZING: 0
SORE THROAT: 0
COLOR CHANGE: 0
VOMITING: 0
SINUS PAIN: 1
TROUBLE SWALLOWING: 0
DIARRHEA: 0
RHINORRHEA: 1
SINUS PRESSURE: 1

## 2022-09-27 NOTE — PROGRESS NOTES
2022    TELEHEALTH EVALUATION -- Audio/Visual (During ZOAQ-74 public health emergency)    HPI:    Sandra Zayas (:  2000) has requested an audio/video evaluation for the following concern(s):    Patient seen virtually for a sick visit    Onset: yesterday  Has sinus pain and pressure. Some congestion. Drainage is yellow. No fevers. No cough. No sore throat. Has drainage from eyes as well. Matted in the am.     Home covid test negative. No sick contacts. Feels like a sinus infection. Had sinus infection in July and symptoms feel similar  Resolved with augmentin. Taking flonase and mucinex sinus max    Review of Systems   Constitutional:  Negative for chills, diaphoresis, fatigue and fever. HENT:  Positive for congestion, postnasal drip, rhinorrhea, sinus pressure and sinus pain. Negative for ear pain, sore throat and trouble swallowing. Respiratory:  Negative for cough, shortness of breath and wheezing. Cardiovascular:  Negative for chest pain, palpitations and leg swelling. Gastrointestinal:  Negative for diarrhea, nausea and vomiting. Genitourinary:  Negative for difficulty urinating. Musculoskeletal:  Negative for myalgias. Skin:  Negative for color change and rash. Neurological:  Positive for headaches. Negative for dizziness, weakness and light-headedness. Prior to Visit Medications    Medication Sig Taking? Authorizing Provider   amoxicillin-clavulanate (AUGMENTIN) 875-125 MG per tablet Take 1 tablet by mouth 2 times daily for 10 days Yes EVAN Todd CNP   fluticasone (FLONASE) 50 MCG/ACT nasal spray 1 spray by Each Nostril route in the morning for 7 days. EVAN Hood CNP 0.15-30 MG-MCG per tablet TK 1 T PO QD  Historical Provider, MD   tretinoin (RETIN-A) 0.025 % cream Apply topically nightly Apply topically nightly.   Historical Provider, MD   Clindamycin Phos-Benzoyl Perox (ONEXTON) 1.2-3.75 % GEL Apply topically  Historical Provider, MD       Social History     Tobacco Use    Smoking status: Never    Smokeless tobacco: Never        Allergies   Allergen Reactions    Nsaids      Blood in urine   ,   Past Medical History:   Diagnosis Date    Premature baby     33 weeks  weight @Birth 4 Lb 12 oz    Thalassemia minor        PHYSICAL EXAMINATION:  [ INSTRUCTIONS:  \"[x]\" Indicates a positive item  \"[]\" Indicates a negative item  -- DELETE ALL ITEMS NOT EXAMINED]  Vital Signs: (As obtained by patient/caregiver or practitioner observation)    Blood pressure-  Heart rate-    Respiratory rate-    Temperature-  Pulse oximetry-     Constitutional: [x] Appears well-developed and well-nourished [x] No apparent distress      [] Abnormal-   Mental status  [x] Alert and awake  [x] Oriented to person/place/time [x]Able to follow commands      Eyes:  EOM    []  Normal  [] Abnormal-  Sclera  []  Normal  [] Abnormal -         Discharge []  None visible  [] Abnormal -    HENT:   [x] Normocephalic, atraumatic. [] Abnormal CONGESTION NOTED. [x] Mouth/Throat: Mucous membranes are moist.     External Ears [x] Normal  [] Abnormal-     Neck: [x] No visualized mass     Pulmonary/Chest: [x] Respiratory effort normal.  [x] No visualized signs of difficulty breathing or respiratory distress        [] Abnormal-      Musculoskeletal:   [] Normal gait with no signs of ataxia         [x] Normal range of motion of neck        [] Abnormal-       Neurological:        [x] No Facial Asymmetry (Cranial nerve 7 motor function) (limited exam to video visit)          [x] No gaze palsy        [] Abnormal-         Skin:        [x] No significant exanthematous lesions or discoloration noted on facial skin         [] Abnormal-            Psychiatric:       [x] Normal Affect [x] No Hallucinations        [] Abnormal-     Other pertinent observable physical exam findings-     ASSESSMENT/PLAN:  1.  Acute non-recurrent sinusitis, unspecified location  Discussed viral vs bacterial. Patient would like to start on anbx due to her recent history of sinusitis and similar symptoms  Continue on mucinex sinus max and flonase  Encouraged sinus rinses 1-2 times per wk. If signs and symptoms worsen in a few days despite the anbx, would recommend to repeat covid test.   Patient agrees with plan of care. Will call if signs and symptoms do not improve  - amoxicillin-clavulanate (AUGMENTIN) 875-125 MG per tablet; Take 1 tablet by mouth 2 times daily for 10 days  Dispense: 20 tablet; Refill: 0    Return if symptoms worsen or fail to improve. Nabeel Kofi, was evaluated through a synchronous (real-time) audio-video encounter. The patient (or guardian if applicable) is aware that this is a billable service, which includes applicable co-pays. This Virtual Visit was conducted with patient's (and/or legal guardian's) consent. The visit was conducted pursuant to the emergency declaration under the [address] [place] [address], [address] [place] [address] authority and the [email or web address] and [employer] General Act. Patient identification was verified, and a caregiver was present when appropriate. The patient was located at Home: [address] [facility] [address]  [address] [address]. Provider was located at [facility] (Appt Dept): [address]  [address] Total time spent on this encounter: Not billed by time    --EVAN Grewal CNP on 9/27/2022 at 12:57 PM    An electronic signature was used to authenticate this note.

## 2022-10-04 ENCOUNTER — PATIENT MESSAGE (OUTPATIENT)
Dept: PRIMARY CARE CLINIC | Age: 22
End: 2022-10-04

## 2022-10-04 NOTE — TELEPHONE ENCOUNTER
From: Esme Perez  To: Dr. Richmond English: 10/4/2022 11:26 AM EDT  Subject: Ibuprofen     Hello! The last few days my big toe has becoming increasingly painful especially to walk on. Its also very swollen on the bottom and red. I was wondering if I am able to take some ibuprofen for it or not since I had the blood in my urine a few months ago? It also started to hurt out of no where and was wondering if I should let my rheumatologist know or give it a few more days.

## 2022-10-11 ENCOUNTER — TELEPHONE (OUTPATIENT)
Dept: RHEUMATOLOGY | Age: 22
End: 2022-10-11

## 2022-10-12 ENCOUNTER — OFFICE VISIT (OUTPATIENT)
Dept: RHEUMATOLOGY | Age: 22
End: 2022-10-12
Payer: COMMERCIAL

## 2022-10-12 VITALS
BODY MASS INDEX: 21.68 KG/M2 | WEIGHT: 127 LBS | HEIGHT: 64 IN | DIASTOLIC BLOOD PRESSURE: 64 MMHG | HEART RATE: 88 BPM | SYSTOLIC BLOOD PRESSURE: 110 MMHG

## 2022-10-12 DIAGNOSIS — M10.9 PODAGRA: Primary | ICD-10-CM

## 2022-10-12 PROCEDURE — 99213 OFFICE O/P EST LOW 20 MIN: CPT | Performed by: INTERNAL MEDICINE

## 2022-10-12 RX ORDER — NABUMETONE 750 MG/1
750 TABLET, FILM COATED ORAL 2 TIMES DAILY
Qty: 60 TABLET | Refills: 3 | Status: SHIPPED | OUTPATIENT
Start: 2022-10-12

## 2022-10-12 NOTE — PROGRESS NOTES
Subjective:       Mike Franco is a 25 y.o. female the patient returns for follow-up of an asymmetric intermittent arthritis. Initially she had symptoms in her right knee and right ankle which resolved before her last visit. About a week and a half ago she developed pain in her right great toe which has increased. No other joints are currently symptomatic. She does have positive antibodies and low titer. Centromere. Review of Systems:    No history of psoriasis she had a second sinus infection in between the 2 bouts of arthritis. Currently no back pain    Objective:     /64   Pulse 88   Ht 5' 4\" (1.626 m)   Wt 127 lb (57.6 kg)   BMI 21.80 kg/m²   Alert female in no acute distress. Slight warmth first MTP on the right swollen slightly tender to palpation equivocal swelling at the left wrist skin no evidence of psoriasis    Assessment:      Podagra possible seronegative spondyloarthropathy such as psoriatic arthritis    Plan:    Patient be started on Relafen 1500 mg a day. She will review literature on methotrexate and TNF inhibitors to consider his treatment. I will see her back as previously scheduled in about 2 weeks at that time repeat blood work and urine and an ANCA to make sure were not missing an atypical presentation of Wegener's.           Jaylin Busch MD, MD, 10/12/2022 9:01 AM

## 2022-10-25 ENCOUNTER — OFFICE VISIT (OUTPATIENT)
Dept: RHEUMATOLOGY | Age: 22
End: 2022-10-25
Payer: COMMERCIAL

## 2022-10-25 VITALS
HEIGHT: 64 IN | DIASTOLIC BLOOD PRESSURE: 78 MMHG | WEIGHT: 125 LBS | BODY MASS INDEX: 21.34 KG/M2 | HEART RATE: 80 BPM | SYSTOLIC BLOOD PRESSURE: 116 MMHG

## 2022-10-25 DIAGNOSIS — M79.604 PAIN OF RIGHT LOWER EXTREMITY: Primary | ICD-10-CM

## 2022-10-25 DIAGNOSIS — R76.8 POSITIVE ANA (ANTINUCLEAR ANTIBODY): ICD-10-CM

## 2022-10-25 PROCEDURE — 99213 OFFICE O/P EST LOW 20 MIN: CPT | Performed by: INTERNAL MEDICINE

## 2022-10-25 NOTE — PROGRESS NOTES
Subjective:       Kathy Sosa is a 25 y.o. female patient returns for follow-up of possible connective tissue disease. She had an episode of swelling in her right foot recently which responded to Relafen. She stopped the medicine after about a week. Prior to that she had swelling of the right knee. Work-up to date is fists revealed antibodies to centromere. Review of Systems:    Denies back pain denies dysuria denies mucosal sores denies morning stiffness denies psoriasis denies difficulty swallowing    Objective:       /78   Pulse 80   Ht 5' 4\" (1.626 m)   Wt 125 lb (56.7 kg)   BMI 21.46 kg/m²   Alert female in no acute distress. 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 Patient will contact the office if she has persistent swelling. Otherwise I will see her in 4 months time. A letter to her referring physician will be dictated if not already sent.         Ramez Merino MD, MD, 10/25/2022 11:36 AM

## 2023-04-17 ENCOUNTER — OFFICE VISIT (OUTPATIENT)
Dept: PRIMARY CARE CLINIC | Age: 23
End: 2023-04-17
Payer: COMMERCIAL

## 2023-04-17 VITALS
RESPIRATION RATE: 16 BRPM | SYSTOLIC BLOOD PRESSURE: 100 MMHG | HEART RATE: 75 BPM | BODY MASS INDEX: 21.51 KG/M2 | TEMPERATURE: 96.9 F | WEIGHT: 126 LBS | OXYGEN SATURATION: 98 % | DIASTOLIC BLOOD PRESSURE: 80 MMHG | HEIGHT: 64 IN

## 2023-04-17 DIAGNOSIS — Z00.00 ROUTINE PHYSICAL EXAMINATION: Primary | ICD-10-CM

## 2023-04-17 DIAGNOSIS — D56.3 THALASSEMIA MINOR: ICD-10-CM

## 2023-04-17 PROCEDURE — 99395 PREV VISIT EST AGE 18-39: CPT | Performed by: FAMILY MEDICINE

## 2023-04-17 SDOH — ECONOMIC STABILITY: HOUSING INSECURITY
IN THE LAST 12 MONTHS, WAS THERE A TIME WHEN YOU DID NOT HAVE A STEADY PLACE TO SLEEP OR SLEPT IN A SHELTER (INCLUDING NOW)?: NO

## 2023-04-17 SDOH — ECONOMIC STABILITY: FOOD INSECURITY: WITHIN THE PAST 12 MONTHS, THE FOOD YOU BOUGHT JUST DIDN'T LAST AND YOU DIDN'T HAVE MONEY TO GET MORE.: NEVER TRUE

## 2023-04-17 SDOH — ECONOMIC STABILITY: FOOD INSECURITY: WITHIN THE PAST 12 MONTHS, YOU WORRIED THAT YOUR FOOD WOULD RUN OUT BEFORE YOU GOT MONEY TO BUY MORE.: NEVER TRUE

## 2023-04-17 SDOH — ECONOMIC STABILITY: INCOME INSECURITY: HOW HARD IS IT FOR YOU TO PAY FOR THE VERY BASICS LIKE FOOD, HOUSING, MEDICAL CARE, AND HEATING?: NOT HARD AT ALL

## 2023-04-17 ASSESSMENT — PATIENT HEALTH QUESTIONNAIRE - PHQ9
3. TROUBLE FALLING OR STAYING ASLEEP: 0
SUM OF ALL RESPONSES TO PHQ QUESTIONS 1-9: 0
SUM OF ALL RESPONSES TO PHQ9 QUESTIONS 1 & 2: 0
SUM OF ALL RESPONSES TO PHQ QUESTIONS 1-9: 0
6. FEELING BAD ABOUT YOURSELF - OR THAT YOU ARE A FAILURE OR HAVE LET YOURSELF OR YOUR FAMILY DOWN: 0
4. FEELING TIRED OR HAVING LITTLE ENERGY: 0
SUM OF ALL RESPONSES TO PHQ QUESTIONS 1-9: 0
10. IF YOU CHECKED OFF ANY PROBLEMS, HOW DIFFICULT HAVE THESE PROBLEMS MADE IT FOR YOU TO DO YOUR WORK, TAKE CARE OF THINGS AT HOME, OR GET ALONG WITH OTHER PEOPLE: 0
8. MOVING OR SPEAKING SO SLOWLY THAT OTHER PEOPLE COULD HAVE NOTICED. OR THE OPPOSITE, BEING SO FIGETY OR RESTLESS THAT YOU HAVE BEEN MOVING AROUND A LOT MORE THAN USUAL: 0
7. TROUBLE CONCENTRATING ON THINGS, SUCH AS READING THE NEWSPAPER OR WATCHING TELEVISION: 0
SUM OF ALL RESPONSES TO PHQ QUESTIONS 1-9: 0
5. POOR APPETITE OR OVEREATING: 0
2. FEELING DOWN, DEPRESSED OR HOPELESS: 0
9. THOUGHTS THAT YOU WOULD BE BETTER OFF DEAD, OR OF HURTING YOURSELF: 0
1. LITTLE INTEREST OR PLEASURE IN DOING THINGS: 0

## 2023-04-17 ASSESSMENT — ENCOUNTER SYMPTOMS
WHEEZING: 0
SHORTNESS OF BREATH: 0
EYES NEGATIVE: 1
ALLERGIC/IMMUNOLOGIC NEGATIVE: 1
BACK PAIN: 0
RESPIRATORY NEGATIVE: 1
GASTROINTESTINAL NEGATIVE: 1
ABDOMINAL PAIN: 0

## 2023-04-17 NOTE — PROGRESS NOTES
SUBJECTIVE:  Patient ID: Bere Multani is a 25 y.o. female. 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  Pt will start 2 week after graduation  Rt knee disorder resolved No specific Dx    Past Medical History:   Diagnosis Date    Premature baby     33 weeks  weight @Birth 4 Lb 12 oz    Thalassemia minor      Past Surgical History:   Procedure Laterality Date    ARM SURGERY Left Age 9    WISDOM TOOTH EXTRACTION  2016     Allergies   Allergen Reactions    Nsaids      Blood in urine       Family History   Problem Relation Age of Onset    Other Mother         Thalessemia minor    Elevated Lipids Father     Diabetes Maternal Grandmother     Thyroid Disease Maternal Grandmother     Heart Attack Paternal Grandfather 79    Other Maternal Aunt         Thallassemia     Social History     Social History Narrative    Study Psychology     [employer] Student    No Tobacco    No alcohol       Patient Active Problem List   Diagnosis    Thalassemia minor     Current Outpatient Medications   Medication Sig Dispense Refill    nabumetone (RELAFEN) 750 MG tablet Take 1 tablet by mouth 2 times daily (Patient not taking: Reported on 4/17/2023) 60 tablet 3    fluticasone (FLONASE) 50 MCG/ACT nasal spray 1 spray by Each Nostril route in the morning for 7 days. (Patient not taking: Reported on 4/17/2023) 1 each 1    KURVELO 0.15-30 MG-MCG per tablet TK 1 T PO QD (Patient not taking: Reported on 4/17/2023)      tretinoin (RETIN-A) 0.025 % cream Apply topically nightly Apply topically nightly. (Patient not taking: Reported on 4/17/2023)      Clindamycin Phos-Benzoyl Perox (ONEXTON) 1.2-3.75 % GEL Apply topically (Patient not taking: Reported on 4/17/2023)       No current facility-administered medications for this visit.      Lab Results   Component Value Date    WBC 6.5 08/31/2022    HGB 10.9 (L) 08/31/2022    HCT 34.4 (L) 08/31/2022

## 2023-06-02 ENCOUNTER — OFFICE VISIT (OUTPATIENT)
Dept: PRIMARY CARE CLINIC | Age: 23
End: 2023-06-02
Payer: COMMERCIAL

## 2023-06-02 VITALS
OXYGEN SATURATION: 98 % | WEIGHT: 126 LBS | TEMPERATURE: 97.6 F | DIASTOLIC BLOOD PRESSURE: 70 MMHG | SYSTOLIC BLOOD PRESSURE: 114 MMHG | BODY MASS INDEX: 21.63 KG/M2 | RESPIRATION RATE: 13 BRPM | HEART RATE: 76 BPM

## 2023-06-02 DIAGNOSIS — N30.00 ACUTE CYSTITIS WITHOUT HEMATURIA: Primary | ICD-10-CM

## 2023-06-02 LAB
BILIRUBIN, POC: NORMAL
BLOOD URINE, POC: NORMAL
CLARITY, POC: CLEAR
COLOR, POC: CLEAR
GLUCOSE URINE, POC: NORMAL
KETONES, POC: NORMAL
LEUKOCYTE EST, POC: NORMAL
NITRITE, POC: NORMAL
PH, POC: 6
PROTEIN, POC: NORMAL
SPECIFIC GRAVITY, POC: 1.01
UROBILINOGEN, POC: 0.2

## 2023-06-02 PROCEDURE — 99213 OFFICE O/P EST LOW 20 MIN: CPT | Performed by: INTERNAL MEDICINE

## 2023-06-02 PROCEDURE — 81002 URINALYSIS NONAUTO W/O SCOPE: CPT | Performed by: INTERNAL MEDICINE

## 2023-06-02 RX ORDER — CIPROFLOXACIN 250 MG/1
250 TABLET, FILM COATED ORAL 2 TIMES DAILY
Qty: 14 TABLET | Refills: 0 | Status: SHIPPED | OUTPATIENT
Start: 2023-06-02 | End: 2023-06-09

## 2023-06-02 NOTE — PROGRESS NOTES
Subjective  Patient complains of urinary frequency, urgency and dysuria, without flank pain, fever, chills, or abnormal vaginal discharge or bleeding. Symptoms have been there for 3 days. No hematuria. Some back discomfort, mild nausea. No vomitting. Tried drinking a lot of water and some over-the-counter remedies without any improvement. Objective  /70 (Site: Left Upper Arm, Position: Sitting, Cuff Size: Medium Adult)   Pulse 76   Temp 97.6 °F (36.4 °C)   Resp 13   Wt 126 lb (57.2 kg)   SpO2 98%   BMI 21.63 kg/m²    Patient is afebrile. Does not look ill. Skin is normal without rash. Cardiovascular exam is normal. Lungs are clear. Abdominal exam is fairly normal except for some mild flank and suprapubic tenderness. There is no abdominal guarding or rebound. Assessment  UTI  Plan  Encourage consumption of lot of fluids. Will start antibiotics and can use antispasmodics if necessary. Suggest taking ascorbic acid supplements on a regular basis if the patient gets these frequently. If patient gets more than 3-4 episodes of UTI in a year then a urological evaluation is in order.

## 2023-06-04 LAB — BACTERIA UR CULT: NORMAL

## 2023-06-26 ENCOUNTER — TELEPHONE (OUTPATIENT)
Dept: PRIMARY CARE CLINIC | Age: 23
End: 2023-06-26

## 2023-07-24 ENCOUNTER — NURSE TRIAGE (OUTPATIENT)
Dept: OTHER | Facility: CLINIC | Age: 23
End: 2023-07-24

## 2023-07-24 ENCOUNTER — HOSPITAL ENCOUNTER (EMERGENCY)
Age: 23
Discharge: HOME | End: 2023-07-24
Attending: STUDENT IN AN ORGANIZED HEALTH CARE EDUCATION/TRAINING PROGRAM
Payer: COMMERCIAL

## 2023-07-24 ENCOUNTER — APPOINTMENT (OUTPATIENT)
Dept: GENERAL RADIOLOGY | Age: 23
End: 2023-07-24
Payer: COMMERCIAL

## 2023-07-24 VITALS
WEIGHT: 126 LBS | HEART RATE: 68 BPM | RESPIRATION RATE: 14 BRPM | DIASTOLIC BLOOD PRESSURE: 77 MMHG | HEIGHT: 64 IN | BODY MASS INDEX: 21.51 KG/M2 | OXYGEN SATURATION: 100 % | TEMPERATURE: 98.4 F | SYSTOLIC BLOOD PRESSURE: 117 MMHG

## 2023-07-24 DIAGNOSIS — R00.2 PALPITATIONS: Primary | ICD-10-CM

## 2023-07-24 DIAGNOSIS — R06.02 SHORTNESS OF BREATH: ICD-10-CM

## 2023-07-24 LAB
ANION GAP SERPL CALCULATED.3IONS-SCNC: 10 MMOL/L (ref 3–16)
ANISOCYTOSIS BLD QL SMEAR: ABNORMAL
B-HCG SERPL EIA 3RD IS-ACNC: <5 MIU/ML
BASOPHILS # BLD: 0.1 K/UL (ref 0–0.2)
BASOPHILS NFR BLD: 0.6 %
BUN SERPL-MCNC: 9 MG/DL (ref 7–20)
CALCIUM SERPL-MCNC: 9.5 MG/DL (ref 8.3–10.6)
CHLORIDE SERPL-SCNC: 105 MMOL/L (ref 99–110)
CO2 SERPL-SCNC: 24 MMOL/L (ref 21–32)
CREAT SERPL-MCNC: 0.6 MG/DL (ref 0.6–1.1)
DEPRECATED RDW RBC AUTO: 14.7 % (ref 12.4–15.4)
EKG ATRIAL RATE: 76 BPM
EKG DIAGNOSIS: NORMAL
EKG P AXIS: 30 DEGREES
EKG P-R INTERVAL: 140 MS
EKG Q-T INTERVAL: 378 MS
EKG QRS DURATION: 86 MS
EKG QTC CALCULATION (BAZETT): 425 MS
EKG R AXIS: 82 DEGREES
EKG T AXIS: 54 DEGREES
EKG VENTRICULAR RATE: 76 BPM
EOSINOPHIL # BLD: 0.1 K/UL (ref 0–0.6)
EOSINOPHIL NFR BLD: 1.4 %
GFR SERPLBLD CREATININE-BSD FMLA CKD-EPI: >60 ML/MIN/{1.73_M2}
GLUCOSE SERPL-MCNC: 98 MG/DL (ref 70–99)
HCG SERPL QL: NEGATIVE
HCT VFR BLD AUTO: 35.6 % (ref 36–48)
HGB BLD-MCNC: 11.5 G/DL (ref 12–16)
LYMPHOCYTES # BLD: 3 K/UL (ref 1–5.1)
LYMPHOCYTES NFR BLD: 27.3 %
MCH RBC QN AUTO: 19.9 PG (ref 26–34)
MCHC RBC AUTO-ENTMCNC: 32.4 G/DL (ref 31–36)
MCV RBC AUTO: 61.4 FL (ref 80–100)
MICROCYTES BLD QL SMEAR: ABNORMAL
MONOCYTES # BLD: 0.7 K/UL (ref 0–1.3)
MONOCYTES NFR BLD: 6.9 %
NEUTROPHILS # BLD: 6.9 K/UL (ref 1.7–7.7)
NEUTROPHILS NFR BLD: 63.8 %
OVALOCYTES BLD QL SMEAR: ABNORMAL
PLATELET # BLD AUTO: 340 K/UL (ref 135–450)
PMV BLD AUTO: 8.7 FL (ref 5–10.5)
POTASSIUM SERPL-SCNC: 4 MMOL/L (ref 3.5–5.1)
RBC # BLD AUTO: 5.8 M/UL (ref 4–5.2)
SODIUM SERPL-SCNC: 139 MMOL/L (ref 136–145)
SPHEROCYTES BLD QL SMEAR: ABNORMAL
WBC # BLD AUTO: 10.8 K/UL (ref 4–11)

## 2023-07-24 PROCEDURE — 84702 CHORIONIC GONADOTROPIN TEST: CPT

## 2023-07-24 PROCEDURE — 85025 COMPLETE CBC W/AUTO DIFF WBC: CPT

## 2023-07-24 PROCEDURE — 80048 BASIC METABOLIC PNL TOTAL CA: CPT

## 2023-07-24 PROCEDURE — 99285 EMERGENCY DEPT VISIT HI MDM: CPT

## 2023-07-24 PROCEDURE — 71046 X-RAY EXAM CHEST 2 VIEWS: CPT

## 2023-07-24 PROCEDURE — 36415 COLL VENOUS BLD VENIPUNCTURE: CPT

## 2023-07-24 PROCEDURE — 84703 CHORIONIC GONADOTROPIN ASSAY: CPT

## 2023-07-24 PROCEDURE — 93005 ELECTROCARDIOGRAM TRACING: CPT | Performed by: STUDENT IN AN ORGANIZED HEALTH CARE EDUCATION/TRAINING PROGRAM

## 2023-07-24 RX ORDER — SPIRONOLACTONE 50 MG/1
50 TABLET, FILM COATED ORAL 2 TIMES DAILY WITH MEALS
COMMUNITY
Start: 2023-06-21

## 2023-07-24 ASSESSMENT — PAIN - FUNCTIONAL ASSESSMENT
PAIN_FUNCTIONAL_ASSESSMENT: NONE - DENIES PAIN
PAIN_FUNCTIONAL_ASSESSMENT: 0-10

## 2023-07-24 ASSESSMENT — ENCOUNTER SYMPTOMS
NAUSEA: 0
VOMITING: 0
SHORTNESS OF BREATH: 1
DIARRHEA: 0

## 2023-07-24 ASSESSMENT — PAIN SCALES - GENERAL: PAINLEVEL_OUTOF10: 0

## 2023-07-24 NOTE — TELEPHONE ENCOUNTER
Location of patient: [place]    Subjective: Caller states \"Palpitations\"     Current Symptoms:   Constant palpitations  \"It feels like I'm out of breath a lot. \"  Intermittent dizziness that will last 30 minutes per patient  Denies CP    Onset: 4 days ago; unchanged    Pain Severity: 0/10    Temperature: Denies    LMP:  Current  Pregnant: No    Recommended disposition: Go to ED Now    Care advice provided, patient verbalizes understanding; denies any other questions or concerns; instructed to call back for any new or worsening symptoms. Patient/caller agrees to proceed to [facility] Emergency Department    Attention Provider: Thank you for allowing me to participate in the care of your patient. The patient was connected to triage in response to symptoms provided. Please do not respond through this encounter as the response is not directed to a shared pool.     Reason for Disposition   Difficulty breathing    Protocols used: Heart Rate and Heartbeat Questions-ADULT-[place]

## 2023-07-24 NOTE — DISCHARGE INSTRUCTIONS
Your blood test, EKG and chest x-ray were all reassuring. We did not ultimately find a cause to your symptoms, so it is important that you follow-up with your primary care doctor to consider additional work-up. If your symptoms worsen in any way, please return to the emergency room.

## 2023-07-25 ENCOUNTER — CARE COORDINATION (OUTPATIENT)
Dept: OTHER | Facility: CLINIC | Age: 23
End: 2023-07-25

## 2023-07-25 NOTE — CARE COORDINATION
Ambulatory Care Coordination Note    ACM attempted to reach patient for introduction to Associate Care Management related to ED visit to [facility] on 7/24/23 for palpitations,SOB. HIPAA compliant message left requesting a return phone call at patient convenience. Plan for follow-up call in 1-2 days  Attempt to reach again on 7/26.       Future Appointments   Date Time Provider [address]  [facility]   7/28/2023  1:15 PM MD MOHIT Rendon

## 2023-07-26 ENCOUNTER — CARE COORDINATION (OUTPATIENT)
Dept: OTHER | Facility: CLINIC | Age: 23
End: 2023-07-26

## 2023-07-26 NOTE — CARE COORDINATION
Ambulatory Care Coordination Note    ACM attempted 2nd outreach to patient for introduction to Associate Care Management related to ED visit on 7/24/23 for palpitations, SOB, Dizziness. HIPAA compliant message left requesting a return phone call at patient convenience. Unable to Reach Letter sent to patient via My Chart. Will continue to outreach.       Future Appointments   Date Time Provider [address]   7/28/2023  1:15 PM MD MOHIT Ramirez

## 2023-07-31 ENCOUNTER — OFFICE VISIT (OUTPATIENT)
Dept: PRIMARY CARE CLINIC | Age: 23
End: 2023-07-31
Payer: COMMERCIAL

## 2023-07-31 VITALS
TEMPERATURE: 97.4 F | DIASTOLIC BLOOD PRESSURE: 68 MMHG | BODY MASS INDEX: 22.07 KG/M2 | HEART RATE: 68 BPM | WEIGHT: 128.6 LBS | SYSTOLIC BLOOD PRESSURE: 116 MMHG | OXYGEN SATURATION: 100 %

## 2023-07-31 DIAGNOSIS — Z09 HOSPITAL DISCHARGE FOLLOW-UP: Primary | ICD-10-CM

## 2023-07-31 DIAGNOSIS — R53.83 OTHER FATIGUE: ICD-10-CM

## 2023-07-31 DIAGNOSIS — Z11.1 SCREENING FOR TUBERCULOSIS: ICD-10-CM

## 2023-07-31 DIAGNOSIS — Z23 NEED FOR TDAP VACCINATION: ICD-10-CM

## 2023-07-31 DIAGNOSIS — Z01.84 IMMUNITY STATUS TESTING: ICD-10-CM

## 2023-07-31 LAB
HBV SURFACE AB SERPL IA-ACNC: 28.2 MIU/ML
TSH SERPL DL<=0.005 MIU/L-ACNC: 0.96 UIU/ML (ref 0.27–4.2)

## 2023-07-31 PROCEDURE — 90715 TDAP VACCINE 7 YRS/> IM: CPT | Performed by: FAMILY MEDICINE

## 2023-07-31 PROCEDURE — 99213 OFFICE O/P EST LOW 20 MIN: CPT | Performed by: FAMILY MEDICINE

## 2023-07-31 PROCEDURE — 90471 IMMUNIZATION ADMIN: CPT | Performed by: FAMILY MEDICINE

## 2023-07-31 ASSESSMENT — ENCOUNTER SYMPTOMS
RESPIRATORY NEGATIVE: 1
GASTROINTESTINAL NEGATIVE: 1
EYES NEGATIVE: 1
ALLERGIC/IMMUNOLOGIC NEGATIVE: 1

## 2023-07-31 NOTE — PROGRESS NOTES
Hb titerSUBJECTIVE:  Patient ID: Bam Chavez is a 21 y.o. female. 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  Dermatology Rx Aldactone     Past Medical History:   Diagnosis Date    Premature baby     33 weeks  weight @Birth 4 Lb 12 oz    Thalassemia minor      Past Surgical History:   Procedure Laterality Date    ARM SURGERY Left Age 9    WISDOM TOOTH EXTRACTION  2016     Allergies   Allergen Reactions    Nsaids      Blood in urine     Family History   Problem Relation Age of Onset    Other Mother         Thalessemia minor    Elevated Lipids Father     Diabetes Maternal Grandmother     Thyroid Disease Maternal Grandmother     Heart Attack Paternal Grandfather 79    Other Maternal Aunt         Thallassemia     Social History     Social History Narrative         Health Science    Start Occupational Therapy Fall 2023    No Tobacco    No alcohol         Patient Active Problem List   Diagnosis    Thalassemia minor     Current Outpatient Medications   Medication Sig Dispense Refill    spironolactone (ALDACTONE) 50 MG tablet Take 1 tablet by mouth 2 times daily (with meals) Pt is take 100 mg in the morning and 50 mg at night (Patient not taking: Reported on 7/31/2023)      tretinoin (RETIN-A) 0.025 % cream Apply topically nightly Apply topically nightly. (Patient not taking: Reported on 7/31/2023)       No current facility-administered medications for this visit.      Lab Results   Component Value Date    WBC 10.8 07/24/2023    HGB 11.5 (L) 07/24/2023    HCT 35.6 (L) 07/24/2023    MCV 61.4 (L) 07/24/2023     07/24/2023     No results found for: CHOL  No results found for: TRIG  No results found for: HDL  No results found for: LDLCHOLESTEROL, LDLCALC  No results found for: LABVLDL, VLDL  No results found for: [facility]    Chemistry        Component Value Date/Time     07/24/2023 1422    K 4.0

## 2023-08-02 ENCOUNTER — NURSE ONLY (OUTPATIENT)
Dept: PRIMARY CARE CLINIC | Age: 23
End: 2023-08-02

## 2023-08-02 LAB
INDURATION: NORMAL
TB SKIN TEST: NEGATIVE

## 2023-08-14 ENCOUNTER — NURSE ONLY (OUTPATIENT)
Dept: PRIMARY CARE CLINIC | Age: 23
End: 2023-08-14

## 2023-08-14 DIAGNOSIS — Z11.1 TUBERCULIN SKIN TEST ENCOUNTER: Primary | ICD-10-CM

## 2023-08-16 ENCOUNTER — NURSE ONLY (OUTPATIENT)
Dept: PRIMARY CARE CLINIC | Age: 23
End: 2023-08-16

## 2023-08-16 LAB
INDURATION: 0
TB SKIN TEST: NEGATIVE

## 2023-08-23 ENCOUNTER — CARE COORDINATION (OUTPATIENT)
Dept: OTHER | Facility: CLINIC | Age: 23
End: 2023-08-23

## 2023-08-23 NOTE — CARE COORDINATION
Ambulatory Care Coordination Note    ACM attempted third and final outreach to patient for introduction to Associate Care Management. Final Unable to Reach Letter sent to patient via My Chart. No further outreach scheduled with this ACM, patient has been provided with this ACM's contact information. ACM will sign off care team at this time. No future appointments.

## 2023-08-28 ENCOUNTER — OFFICE VISIT (OUTPATIENT)
Dept: URGENT CARE | Age: 23
End: 2023-08-28

## 2023-08-28 VITALS
HEART RATE: 68 BPM | WEIGHT: 130 LBS | DIASTOLIC BLOOD PRESSURE: 74 MMHG | OXYGEN SATURATION: 97 % | TEMPERATURE: 98.9 F | HEIGHT: 64 IN | BODY MASS INDEX: 22.2 KG/M2 | SYSTOLIC BLOOD PRESSURE: 117 MMHG

## 2023-08-28 DIAGNOSIS — S76.311A STRAIN OF HAMSTRING MUSCLE, RIGHT, INITIAL ENCOUNTER: Primary | ICD-10-CM

## 2023-08-28 PROBLEM — M54.17 LUMBOSACRAL RADICULOPATHY: Status: ACTIVE | Noted: 2022-08-26

## 2023-08-28 PROBLEM — M26.69 CAPSULITIS OF TEMPOROMANDIBULAR JOINT: Status: ACTIVE | Noted: 2021-07-22

## 2023-08-28 PROBLEM — D50.9 MICROCYTIC ANEMIA: Status: ACTIVE | Noted: 2023-08-28

## 2023-08-28 PROBLEM — M79.18 MYOFASCIAL PAIN: Status: ACTIVE | Noted: 2021-07-22

## 2023-08-28 NOTE — PATIENT INSTRUCTIONS
Rest, Ice and Elevate right leg  Alternate Tylenol and Ibuprofen for pain  Gentle stretching twice daily  Follow up with ortho and neurology as needed

## 2023-09-07 ENCOUNTER — PATIENT MESSAGE (OUTPATIENT)
Dept: PRIMARY CARE CLINIC | Age: 23
End: 2023-09-07

## 2023-09-07 ENCOUNTER — TELEPHONE (OUTPATIENT)
Dept: PRIMARY CARE CLINIC | Age: 23
End: 2023-09-07

## 2023-09-07 NOTE — TELEPHONE ENCOUNTER
From: Milli Brock  To: Dr. Alea Mazariegos: 9/7/2023 2:15 PM EDT  Subject: Leg    Hello! You should be getting an note from [facility] shortly. I have done something to my leg and am in need of an MRI. Unfortunately since [facility] is not covered the MRI order must come from you. [facility] is the place I have been to before if you could send it there. Let me know my next steps as well as if I need to come in.

## 2023-09-07 NOTE — TELEPHONE ENCOUNTER
[facility] called asking if we would order a MRI for the patient she was also seen in [facility] urgent care . Samreen Pillai is sending office notes.  For us to review as well

## 2023-09-08 ENCOUNTER — TELEPHONE (OUTPATIENT)
Dept: ORTHOPEDIC SURGERY | Age: 23
End: 2023-09-08

## 2023-09-08 NOTE — TELEPHONE ENCOUNTER
General Question     Subject: MRI RESULTS-DR Fidencio Funk OFFICE  Patient and /or Facility Request: Angelique Garzafroylan  Contact Number: [phone]    PT CALLED TO SPEAK WITH CLINICAL. PT HAD MRI COMPLETED AT [facility] YESTERDAY, 9/7. SHE STATED THAT THEY WILL NOT FORWARD MRI RESULTS UNLESS REQUESTED FROM DR Fidencio Funk. PT STATED THAT SHE DOES NOT WANT TO FORGO MORE IMAGING. SHE REQUESTS FOR CLINICAL TO REQUESTS IMAGING FOR OFFICE REVIEW BEFORE APPT 9/11/23-9:45A.     PLEASE ADVISE

## 2023-09-11 ENCOUNTER — OFFICE VISIT (OUTPATIENT)
Dept: ORTHOPEDIC SURGERY | Age: 23
End: 2023-09-11
Payer: COMMERCIAL

## 2023-09-11 VITALS — HEIGHT: 64 IN | BODY MASS INDEX: 22.2 KG/M2 | WEIGHT: 130 LBS

## 2023-09-11 DIAGNOSIS — S76.219A: Primary | ICD-10-CM

## 2023-09-11 PROCEDURE — 99214 OFFICE O/P EST MOD 30 MIN: CPT | Performed by: ORTHOPAEDIC SURGERY

## 2023-09-11 NOTE — PROGRESS NOTES
Date:  2023    Name:  Amanda Benítez  Address:  [address] [place] [address] [address]  [address]    :  2000      Age:   21 y.o.    SSN:        Medical Record Number:  [id number]    Reason for Visit:    Chief Complaint    Leg Pain (Right leg Pain)      DOS:2023     HPI: Caroline Fuentes is a 21 y.o. female here today for evaluation of right hip pain that has been ongoing for 2 weeks. 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 Following that she had bruising and mild weakness. For treatment, patient has tried ice and ibuprofen with minimal relief. She notes swelling, denies numbness and tingling. Pain is worse with leg lifts and applied pressure. She is here to discuss treatment options. ROS: Review of systems reviewed from Patient History Form completed today and available in the patient's chart under the Media tab.      Past Medical History:   Diagnosis Date    Premature baby     33 weeks  weight @Birth 4 Lb 12 oz    Thalassemia minor         Past Surgical History:   Procedure Laterality Date    ARM SURGERY Left Age 9    WISDOM TOOTH EXTRACTION  2016       Family History   Problem Relation Age of Onset    Other Mother         Thalessemia minor    Elevated Lipids Father     Diabetes Maternal Grandmother     Thyroid Disease Maternal Grandmother     Heart Attack Paternal Grandfather 79    Other Maternal Aunt         Thallassemia       Social History     Socioeconomic History    Marital status: Single   Occupational History     Comment:  student/Psycology    Tobacco Use    Smoking status: Never    Smokeless tobacco: Never   Substance and Sexual Activity    Drug use: Never   Social History Narrative         Health Science    Start Occupational Therapy 2023    No Tobacco    No alcohol     Social Determinants of Health     Financial Resource Strain: Low Risk  (2023)    Overall Financial Resource Strain (CARDIA)     Difficulty of Paying Living Expenses: Not hard

## 2023-09-18 ENCOUNTER — HOSPITAL ENCOUNTER (OUTPATIENT)
Dept: PHYSICAL THERAPY | Age: 23
Setting detail: THERAPIES SERIES
Discharge: HOME | End: 2023-09-18
Attending: ORTHOPAEDIC SURGERY
Payer: COMMERCIAL

## 2023-09-18 PROCEDURE — 97110 THERAPEUTIC EXERCISES: CPT | Performed by: PHYSICAL THERAPIST

## 2023-09-18 PROCEDURE — 97161 PT EVAL LOW COMPLEX 20 MIN: CPT | Performed by: PHYSICAL THERAPIST

## 2023-09-18 NOTE — PROGRESS NOTES
[facility] [place]  Phone [phone]  Fax [phone]      Injury Notification    To Whom It May Concern:       Date: 9/18/2023    This Letter is to inform you that Army Man was seen by sports medicine and orthopaedic specialists at the [address] [place] [address]. The purpose of this letter is to provide immediate information on the diagnosis, treatment, and any activity restrictions for the above athlete. If you have any questions, please do not hesitate to call us and we will immediately put you in touch with the physician who treated this athlete. Diagnosis Information:   U67.752I (ICD-10-CM) - Rupture of adductor muscle of hip    Rehabilitation and immediate treatment program: Rita Redman is currently being treated for her inner thigh pain. Restrictions / Return to sport:  She does not have any restrictions at this time. She should use pain as her guide - if something hurts discontinue. Work Name:  [employer]    Physician: Dr. Annika Erickson    Completed by: Carl Peguero PT        BILLING/INSURANCE INFORMATION:    School insurance is considered secondary coverage and will only cover treatment if personal insurance is not available or has failed to cover the charges for treatment rendered. Even though the injury may have occurred during the participation in a school sport activity, you must file your personal insurance first before making a claim against a school insurance policy if one exists. All charges are your responsibility.

## 2023-09-18 NOTE — FLOWSHEET NOTE
ambulation. Modalities:      Charges:  Timed Code Treatment Minutes: 20   Total Treatment Minutes: 50       [x] EVAL (LOW) 34476 (typically 20 minutes face-to-face)  [] EVAL (MOD) 98790 (typically 30 minutes face-to-face)  [] EVAL (HIGH) 52030 (typically 45 minutes face-to-face)  [] RE-EVAL     [x] NJ(66815) x 1     [] IONTO  [] NMR (10310) x     [] VASO  [] Manual (87497) x      [] Other:  [] TA x      [] Mech Traction (64803)  [] ES(attended) (36449)      [] ES (un) (79221):       HEP instruction:   Access Code: [email or web address]  URL: [email or web address]  Date: 09/18/2023  Prepared by: Cristian Gutierrez     Exercises  - Seated Hamstring Stretch  - 2-3 x daily - 7 x weekly - 1 sets - 3 reps - 30\" hold  - Supine Piriformis Stretch with Foot on Ground  - 2 x daily - 7 x weekly - 1 sets - 3 reps - 30\" hold  - Side Lunge Adductor Stretch  - 1 x daily - 7 x weekly - 1 sets - 3 reps - 30\" hold  - Supine Active Straight Leg Raise  - 1 x daily - 7 x weekly - 3 sets - 10 reps  - Sidelying Hip Abduction  - 1 x daily - 7 x weekly - 3 sets - 10 reps  - Clamshell  - 1 x daily - 7 x weekly - 3 sets - 10 reps  - Sidelying Reverse Clamshell  - 1 x daily - 7 x weekly - 3 sets - 10 reps        GOALS:   Patient stated goal: Get back to PLOF     [] Progressing: [] Met: [] Not Met: [] Adjusted     Therapist goals for Patient:   Short Term Goals: To be achieved in: 2 weeks  1. Independent in HEP and progression per patient tolerance, in order to prevent re-injury. [] Progressing: [] Met: [] Not Met: [] Adjusted   2. Patient will have a decrease in pain to facilitate improvement in movement, function, and ADLs as indicated by Functional Deficits. [] Progressing: [] Met: [] Not Met: [] Adjusted     Long Term Goals: To be achieved in: 6-8 weeks  1. Disability index score of 5% or less for the LEFS to assist with reaching prior level of function. [] Progressing: [] Met: [] Not Met: [] Adjusted  2.  Patient will demonstrate increased

## 2023-09-25 ENCOUNTER — HOSPITAL ENCOUNTER (OUTPATIENT)
Dept: PHYSICAL THERAPY | Age: 23
Setting detail: THERAPIES SERIES
Discharge: HOME | End: 2023-09-25
Attending: ORTHOPAEDIC SURGERY
Payer: COMMERCIAL

## 2023-09-25 PROCEDURE — 97112 NEUROMUSCULAR REEDUCATION: CPT | Performed by: PHYSICAL THERAPIST

## 2023-09-25 PROCEDURE — 97110 THERAPEUTIC EXERCISES: CPT | Performed by: PHYSICAL THERAPIST

## 2023-09-25 NOTE — FLOWSHEET NOTE
promoting relaxation,  increasing ROM, reducing/eliminating soft tissue swelling/inflammation/restriction, improving soft tissue extensibility and allowing for proper ROM for normal function with self care, mobility, lifting and ambulation. Modalities:      Charges:  Timed Code Treatment Minutes: 30   Total Treatment Minutes: 1:40-2:20       [] EVAL (LOW) 51514 (typically 20 minutes face-to-face)  [] EVAL (MOD) 37729 (typically 30 minutes face-to-face)  [] EVAL (HIGH) 47778 (typically 45 minutes face-to-face)  [] RE-EVAL     [x] TF(35933) x 1     [] IONTO  [x] NMR (57549) x 1     [] VASO  [] Manual (38258) x      [] Other:  [] TA x      [] Mech Traction (03287)  [] ES(attended) (95615)      [] ES (un) (10845):       HEP instruction:   Access Code: [email or web address]  URL: [email or web address]  Date: 09/18/2023  Prepared by: Malika Conroy     Exercises  - Seated Hamstring Stretch  - 2-3 x daily - 7 x weekly - 1 sets - 3 reps - 30\" hold  - Supine Piriformis Stretch with Foot on Ground  - 2 x daily - 7 x weekly - 1 sets - 3 reps - 30\" hold  - Side Lunge Adductor Stretch  - 1 x daily - 7 x weekly - 1 sets - 3 reps - 30\" hold  - Supine Active Straight Leg Raise  - 1 x daily - 7 x weekly - 3 sets - 10 reps  - Sidelying Hip Abduction  - 1 x daily - 7 x weekly - 3 sets - 10 reps  - Clamshell  - 1 x daily - 7 x weekly - 3 sets - 10 reps  - Sidelying Reverse Clamshell  - 1 x daily - 7 x weekly - 3 sets - 10 reps        GOALS:   Patient stated goal: Get back to PLOF     [] Progressing: [] Met: [] Not Met: [] Adjusted     Therapist goals for Patient:   Short Term Goals: To be achieved in: 2 weeks  1. Independent in HEP and progression per patient tolerance, in order to prevent re-injury. [] Progressing: [] Met: [] Not Met: [] Adjusted   2. Patient will have a decrease in pain to facilitate improvement in movement, function, and ADLs as indicated by Functional Deficits.     [] Progressing: [] Met: [] Not Met: [] Adjusted

## 2023-09-28 ENCOUNTER — HOSPITAL ENCOUNTER (OUTPATIENT)
Dept: PHYSICAL THERAPY | Age: 23
Setting detail: THERAPIES SERIES
Discharge: HOME | End: 2023-09-28
Attending: ORTHOPAEDIC SURGERY
Payer: COMMERCIAL

## 2023-09-28 PROCEDURE — 97110 THERAPEUTIC EXERCISES: CPT | Performed by: PHYSICAL THERAPIST

## 2023-09-28 PROCEDURE — 97112 NEUROMUSCULAR REEDUCATION: CPT | Performed by: PHYSICAL THERAPIST

## 2023-09-28 PROCEDURE — 97530 THERAPEUTIC ACTIVITIES: CPT | Performed by: PHYSICAL THERAPIST

## 2023-09-28 NOTE — FLOWSHEET NOTE
(06787) Provided manual therapy to mobilize LE, proximal hip and/or LS spine soft tissue/joints for the purpose of modulating pain, promoting relaxation,  increasing ROM, reducing/eliminating soft tissue swelling/inflammation/restriction, improving soft tissue extensibility and allowing for proper ROM for normal function with self care, mobility, lifting and ambulation. Modalities:      Charges:  Timed Code Treatment Minutes: 42   Total Treatment Minutes: 2:30-8:24       [] EVAL (LOW) 81538 (typically 20 minutes face-to-face)  [] EVAL (MOD) 97143 (typically 30 minutes face-to-face)  [] EVAL (HIGH) 51254 (typically 45 minutes face-to-face)  [] RE-EVAL     [x] FI(05868) x 1     [] IONTO  [x] NMR (30534) x 1     [] VASO  [] Manual (51860) x      [] Other:  [x] TA x 1     [] Mech Traction (07129)  [] ES(attended) (20090)      [] ES (un) (74747):       HEP instruction:   Access Code: [email or web address]  URL: [email or web address]  Date: 09/18/2023  Prepared by: Bobby Gonzales     Exercises  - Seated Hamstring Stretch  - 2-3 x daily - 7 x weekly - 1 sets - 3 reps - 30\" hold  - Supine Piriformis Stretch with Foot on Ground  - 2 x daily - 7 x weekly - 1 sets - 3 reps - 30\" hold  - Side Lunge Adductor Stretch  - 1 x daily - 7 x weekly - 1 sets - 3 reps - 30\" hold  - Supine Active Straight Leg Raise  - 1 x daily - 7 x weekly - 3 sets - 10 reps  - Sidelying Hip Abduction  - 1 x daily - 7 x weekly - 3 sets - 10 reps  - Clamshell  - 1 x daily - 7 x weekly - 3 sets - 10 reps  - Sidelying Reverse Clamshell  - 1 x daily - 7 x weekly - 3 sets - 10 reps        GOALS:   Patient stated goal: Get back to PLOF     [] Progressing: [] Met: [] Not Met: [] Adjusted     Therapist goals for Patient:   Short Term Goals: To be achieved in: 2 weeks  1. Independent in HEP and progression per patient tolerance, in order to prevent re-injury. [] Progressing: [] Met: [] Not Met: [] Adjusted   2.  Patient will have a decrease in pain to facilitate

## 2023-10-04 ENCOUNTER — HOSPITAL ENCOUNTER (OUTPATIENT)
Dept: PHYSICAL THERAPY | Age: 23
Setting detail: THERAPIES SERIES
Discharge: HOME | End: 2023-10-04
Attending: ORTHOPAEDIC SURGERY
Payer: COMMERCIAL

## 2023-10-04 PROCEDURE — 97530 THERAPEUTIC ACTIVITIES: CPT | Performed by: PHYSICAL THERAPIST

## 2023-10-04 PROCEDURE — 97112 NEUROMUSCULAR REEDUCATION: CPT | Performed by: PHYSICAL THERAPIST

## 2023-10-04 PROCEDURE — 97110 THERAPEUTIC EXERCISES: CPT | Performed by: PHYSICAL THERAPIST

## 2023-10-13 ENCOUNTER — HOSPITAL ENCOUNTER (OUTPATIENT)
Dept: PHYSICAL THERAPY | Age: 23
Setting detail: THERAPIES SERIES
Discharge: HOME | End: 2023-10-13
Attending: ORTHOPAEDIC SURGERY
Payer: COMMERCIAL

## 2023-10-13 NOTE — FLOWSHEET NOTE
Physical Therapy  Cancellation/No-show Note  Patient Name:  Marley Rosado  :  2000   Date:  10/13/2023  Cancelled visits to date: 01  No-shows to date: 0    For today's appointment patient:  [x]  Cancelled  []  Rescheduled appointment  []  No-show     Reason given by patient:  []  Patient ill  []  Conflicting appointment  []  No transportation    []  Conflict with work  [x]  No reason given  []  Other:     Comments:      Electronically signed by:  Mayelin Baptiste, PT, DPT

## 2023-10-20 ENCOUNTER — HOSPITAL ENCOUNTER (OUTPATIENT)
Dept: PHYSICAL THERAPY | Age: 23
Setting detail: THERAPIES SERIES
Discharge: HOME | End: 2023-10-20
Attending: ORTHOPAEDIC SURGERY
Payer: COMMERCIAL

## 2023-10-20 PROCEDURE — 97530 THERAPEUTIC ACTIVITIES: CPT | Performed by: PHYSICAL THERAPIST

## 2023-10-20 PROCEDURE — 97112 NEUROMUSCULAR REEDUCATION: CPT | Performed by: PHYSICAL THERAPIST

## 2023-10-20 PROCEDURE — 97110 THERAPEUTIC EXERCISES: CPT | Performed by: PHYSICAL THERAPIST

## 2024-07-08 ENCOUNTER — OFFICE VISIT (OUTPATIENT)
Dept: PRIMARY CARE CLINIC | Age: 24
End: 2024-07-08
Payer: COMMERCIAL

## 2024-07-08 VITALS
SYSTOLIC BLOOD PRESSURE: 116 MMHG | DIASTOLIC BLOOD PRESSURE: 82 MMHG | OXYGEN SATURATION: 97 % | WEIGHT: 137.2 LBS | BODY MASS INDEX: 22.86 KG/M2 | HEIGHT: 65 IN | HEART RATE: 94 BPM

## 2024-07-08 DIAGNOSIS — Z13.220 LIPID SCREENING: ICD-10-CM

## 2024-07-08 DIAGNOSIS — E55.9 VITAMIN D DEFICIENCY: ICD-10-CM

## 2024-07-08 DIAGNOSIS — Z00.00 VISIT FOR ANNUAL HEALTH EXAMINATION: Primary | ICD-10-CM

## 2024-07-08 DIAGNOSIS — Z00.00 VISIT FOR ANNUAL HEALTH EXAMINATION: ICD-10-CM

## 2024-07-08 DIAGNOSIS — Z11.1 PPD SCREENING TEST: ICD-10-CM

## 2024-07-08 LAB
25(OH)D3 SERPL-MCNC: 33.2 NG/ML
ALBUMIN SERPL-MCNC: 4.7 G/DL (ref 3.4–5)
ALBUMIN/GLOB SERPL: 2 {RATIO} (ref 1.1–2.2)
ALP SERPL-CCNC: 82 U/L (ref 40–129)
ALT SERPL-CCNC: 15 U/L (ref 10–40)
AMPHETAMINES UR QL SCN>1000 NG/ML: NORMAL
ANION GAP SERPL CALCULATED.3IONS-SCNC: 12 MMOL/L (ref 3–16)
AST SERPL-CCNC: 22 U/L (ref 15–37)
BARBITURATES UR QL SCN>200 NG/ML: NORMAL
BASOPHILS # BLD: 0.1 K/UL (ref 0–0.2)
BASOPHILS NFR BLD: 0.6 %
BENZODIAZ UR QL SCN>200 NG/ML: NORMAL
BILIRUB SERPL-MCNC: <0.2 MG/DL (ref 0–1)
BUN SERPL-MCNC: 10 MG/DL (ref 7–20)
CALCIUM SERPL-MCNC: 9.3 MG/DL (ref 8.3–10.6)
CANNABINOIDS UR QL SCN>50 NG/ML: NORMAL
CHLORIDE SERPL-SCNC: 103 MMOL/L (ref 99–110)
CHOLEST SERPL-MCNC: 173 MG/DL (ref 0–199)
CO2 SERPL-SCNC: 23 MMOL/L (ref 21–32)
COCAINE UR QL SCN: NORMAL
CREAT SERPL-MCNC: 0.6 MG/DL (ref 0.6–1.1)
DEPRECATED RDW RBC AUTO: 15.6 % (ref 12.4–15.4)
DRUG SCREEN COMMENT UR-IMP: NORMAL
EOSINOPHIL # BLD: 0.2 K/UL (ref 0–0.6)
EOSINOPHIL NFR BLD: 1.7 %
FENTANYL SCREEN, URINE: NORMAL
GFR SERPLBLD CREATININE-BSD FMLA CKD-EPI: >90 ML/MIN/{1.73_M2}
GLUCOSE SERPL-MCNC: 88 MG/DL (ref 70–99)
HCT VFR BLD AUTO: 34.8 % (ref 36–48)
HDLC SERPL-MCNC: 48 MG/DL (ref 40–60)
HGB BLD-MCNC: 10.8 G/DL (ref 12–16)
LDLC SERPL CALC-MCNC: 113 MG/DL
LYMPHOCYTES # BLD: 3.7 K/UL (ref 1–5.1)
LYMPHOCYTES NFR BLD: 33 %
MCH RBC QN AUTO: 17.7 PG (ref 26–34)
MCHC RBC AUTO-ENTMCNC: 31.1 G/DL (ref 31–36)
MCV RBC AUTO: 57 FL (ref 80–100)
METHADONE UR QL SCN>300 NG/ML: NORMAL
MONOCYTES # BLD: 0.8 K/UL (ref 0–1.3)
MONOCYTES NFR BLD: 6.9 %
NEUTROPHILS # BLD: 6.5 K/UL (ref 1.7–7.7)
NEUTROPHILS NFR BLD: 57.8 %
OPIATES UR QL SCN>300 NG/ML: NORMAL
OXYCODONE UR QL SCN: NORMAL
PATH INTERP BLD-IMP: NO
PCP UR QL SCN>25 NG/ML: NORMAL
PH UR STRIP: 6 [PH]
PLATELET # BLD AUTO: 382 K/UL (ref 135–450)
PMV BLD AUTO: 9.4 FL (ref 5–10.5)
POTASSIUM SERPL-SCNC: 3.9 MMOL/L (ref 3.5–5.1)
PROT SERPL-MCNC: 7.1 G/DL (ref 6.4–8.2)
RBC # BLD AUTO: 6.11 M/UL (ref 4–5.2)
SODIUM SERPL-SCNC: 138 MMOL/L (ref 136–145)
TRIGL SERPL-MCNC: 59 MG/DL (ref 0–150)
TSH SERPL DL<=0.005 MIU/L-ACNC: 2.12 UIU/ML (ref 0.27–4.2)
VLDLC SERPL CALC-MCNC: 12 MG/DL
WBC # BLD AUTO: 11.3 K/UL (ref 4–11)

## 2024-07-08 PROCEDURE — 99395 PREV VISIT EST AGE 18-39: CPT | Performed by: FAMILY MEDICINE

## 2024-07-08 PROCEDURE — 86580 TB INTRADERMAL TEST: CPT | Performed by: FAMILY MEDICINE

## 2024-07-08 RX ORDER — LEVONORGESTREL AND ETHINYL ESTRADIOL 0.15-0.03
1 KIT ORAL DAILY
COMMUNITY
Start: 2024-05-31

## 2024-07-08 SDOH — ECONOMIC STABILITY: FOOD INSECURITY: WITHIN THE PAST 12 MONTHS, YOU WORRIED THAT YOUR FOOD WOULD RUN OUT BEFORE YOU GOT MONEY TO BUY MORE.: NEVER TRUE

## 2024-07-08 SDOH — ECONOMIC STABILITY: INCOME INSECURITY: HOW HARD IS IT FOR YOU TO PAY FOR THE VERY BASICS LIKE FOOD, HOUSING, MEDICAL CARE, AND HEATING?: NOT HARD AT ALL

## 2024-07-08 SDOH — ECONOMIC STABILITY: FOOD INSECURITY: WITHIN THE PAST 12 MONTHS, THE FOOD YOU BOUGHT JUST DIDN'T LAST AND YOU DIDN'T HAVE MONEY TO GET MORE.: NEVER TRUE

## 2024-07-08 ASSESSMENT — PATIENT HEALTH QUESTIONNAIRE - PHQ9
9. THOUGHTS THAT YOU WOULD BE BETTER OFF DEAD, OR OF HURTING YOURSELF: NOT AT ALL
6. FEELING BAD ABOUT YOURSELF - OR THAT YOU ARE A FAILURE OR HAVE LET YOURSELF OR YOUR FAMILY DOWN: NOT AT ALL
5. POOR APPETITE OR OVEREATING: NOT AT ALL
7. TROUBLE CONCENTRATING ON THINGS, SUCH AS READING THE NEWSPAPER OR WATCHING TELEVISION: NOT AT ALL
10. IF YOU CHECKED OFF ANY PROBLEMS, HOW DIFFICULT HAVE THESE PROBLEMS MADE IT FOR YOU TO DO YOUR WORK, TAKE CARE OF THINGS AT HOME, OR GET ALONG WITH OTHER PEOPLE: NOT DIFFICULT AT ALL
SUM OF ALL RESPONSES TO PHQ QUESTIONS 1-9: 0
3. TROUBLE FALLING OR STAYING ASLEEP: NOT AT ALL
SUM OF ALL RESPONSES TO PHQ QUESTIONS 1-9: 0
1. LITTLE INTEREST OR PLEASURE IN DOING THINGS: NOT AT ALL
2. FEELING DOWN, DEPRESSED OR HOPELESS: NOT AT ALL
8. MOVING OR SPEAKING SO SLOWLY THAT OTHER PEOPLE COULD HAVE NOTICED. OR THE OPPOSITE, BEING SO FIGETY OR RESTLESS THAT YOU HAVE BEEN MOVING AROUND A LOT MORE THAN USUAL: NOT AT ALL
SUM OF ALL RESPONSES TO PHQ QUESTIONS 1-9: 0
SUM OF ALL RESPONSES TO PHQ9 QUESTIONS 1 & 2: 0
4. FEELING TIRED OR HAVING LITTLE ENERGY: NOT AT ALL
SUM OF ALL RESPONSES TO PHQ QUESTIONS 1-9: 0

## 2024-07-08 ASSESSMENT — ENCOUNTER SYMPTOMS
BACK PAIN: 0
RESPIRATORY NEGATIVE: 1
WHEEZING: 0
ABDOMINAL PAIN: 0
ALLERGIC/IMMUNOLOGIC NEGATIVE: 1
EYES NEGATIVE: 1
DIARRHEA: 0
NAUSEA: 0
CHOKING: 0
APNEA: 0
VOMITING: 0
ROS SKIN COMMENTS: DERMATOLOGY ANNUAL

## 2024-07-08 NOTE — PROGRESS NOTES
Cervical back: Normal range of motion and neck supple.      Right lower leg: No edema.      Left lower leg: No edema.   Skin:     Findings: No rash.   Neurological:      General: No focal deficit present.      Mental Status: She is alert and oriented to person, place, and time.   Psychiatric:         Mood and Affect: Mood normal.         Behavior: Behavior normal.         Thought Content: Thought content normal.         Judgment: Judgment normal.         ASSESSMENT/PLAN:      Diagnosis Orders   1. Visit for annual health examination  CBC with Auto Differential    Comprehensive Metabolic Panel    Lipid Panel    TSH    Vitamin D 25 Hydroxy    DRUG SCREEN MULTI URINE      2. Lipid screening  Lipid Panel      3. Vitamin D deficiency  Vitamin D 25 Hydroxy      4. PPD screening test  Mantoux testing          Lab  Screen test  Form signed

## 2024-07-10 ENCOUNTER — LAB (OUTPATIENT)
Dept: PRIMARY CARE CLINIC | Age: 24
End: 2024-07-10

## 2024-07-10 LAB
INDURATION: NORMAL
TB SKIN TEST: NORMAL

## 2024-07-22 ENCOUNTER — NURSE ONLY (OUTPATIENT)
Dept: PRIMARY CARE CLINIC | Age: 24
End: 2024-07-22
Payer: COMMERCIAL

## 2024-07-22 DIAGNOSIS — Z11.1 PPD SCREENING TEST: Primary | ICD-10-CM

## 2024-07-22 PROCEDURE — 86580 TB INTRADERMAL TEST: CPT | Performed by: FAMILY MEDICINE

## 2024-07-24 ENCOUNTER — NURSE ONLY (OUTPATIENT)
Dept: PRIMARY CARE CLINIC | Age: 24
End: 2024-07-24

## 2024-07-24 LAB
INDURATION: NORMAL
TB SKIN TEST: NEGATIVE

## 2024-07-25 ENCOUNTER — TELEPHONE (OUTPATIENT)
Dept: PRIMARY CARE CLINIC | Age: 24
End: 2024-07-25

## 2025-07-11 ENCOUNTER — OFFICE VISIT (OUTPATIENT)
Dept: PRIMARY CARE CLINIC | Age: 25
End: 2025-07-11
Payer: COMMERCIAL

## 2025-07-11 VITALS
DIASTOLIC BLOOD PRESSURE: 70 MMHG | SYSTOLIC BLOOD PRESSURE: 110 MMHG | HEIGHT: 65 IN | OXYGEN SATURATION: 98 % | TEMPERATURE: 98.8 F | HEART RATE: 85 BPM | WEIGHT: 130 LBS | BODY MASS INDEX: 21.66 KG/M2

## 2025-07-11 DIAGNOSIS — Z00.00 ENCOUNTER FOR WELLNESS EXAMINATION IN ADULT: ICD-10-CM

## 2025-07-11 DIAGNOSIS — Z13.21 ENCOUNTER FOR VITAMIN DEFICIENCY SCREENING: ICD-10-CM

## 2025-07-11 DIAGNOSIS — D56.3 THALASSEMIA MINOR: ICD-10-CM

## 2025-07-11 DIAGNOSIS — Z13.29 SCREENING FOR THYROID DISORDER: ICD-10-CM

## 2025-07-11 DIAGNOSIS — M26.69: ICD-10-CM

## 2025-07-11 DIAGNOSIS — Z00.00 ENCOUNTER FOR WELLNESS EXAMINATION IN ADULT: Primary | ICD-10-CM

## 2025-07-11 DIAGNOSIS — Z11.59 NEED FOR HEPATITIS C SCREENING TEST: ICD-10-CM

## 2025-07-11 DIAGNOSIS — Z13.1 SCREENING FOR DIABETES MELLITUS: ICD-10-CM

## 2025-07-11 DIAGNOSIS — Z11.1 SCREENING FOR TUBERCULOSIS: ICD-10-CM

## 2025-07-11 DIAGNOSIS — Z11.4 SCREENING FOR HIV WITHOUT PRESENCE OF RISK FACTORS: ICD-10-CM

## 2025-07-11 DIAGNOSIS — D56.3 BETA THALASSEMIA TRAIT: ICD-10-CM

## 2025-07-11 PROBLEM — R06.09 DOE (DYSPNEA ON EXERTION): Status: RESOLVED | Noted: 2017-02-09 | Resolved: 2025-07-11

## 2025-07-11 PROBLEM — M54.17 LUMBOSACRAL RADICULOPATHY: Status: RESOLVED | Noted: 2022-08-26 | Resolved: 2025-07-11

## 2025-07-11 PROBLEM — M79.18 MYOFASCIAL PAIN: Status: RESOLVED | Noted: 2021-07-22 | Resolved: 2025-07-11

## 2025-07-11 PROCEDURE — 99395 PREV VISIT EST AGE 18-39: CPT

## 2025-07-11 SDOH — HEALTH STABILITY: PHYSICAL HEALTH: ON AVERAGE, HOW MANY DAYS PER WEEK DO YOU ENGAGE IN MODERATE TO STRENUOUS EXERCISE (LIKE A BRISK WALK)?: 3 DAYS

## 2025-07-11 SDOH — HEALTH STABILITY: PHYSICAL HEALTH: ON AVERAGE, HOW MANY MINUTES DO YOU ENGAGE IN EXERCISE AT THIS LEVEL?: 60 MIN

## 2025-07-11 SDOH — ECONOMIC STABILITY: INCOME INSECURITY: IN THE LAST 12 MONTHS, WAS THERE A TIME WHEN YOU WERE NOT ABLE TO PAY THE MORTGAGE OR RENT ON TIME?: NO

## 2025-07-11 ASSESSMENT — ENCOUNTER SYMPTOMS
CONSTIPATION: 0
COUGH: 0
ANAL BLEEDING: 0
ABDOMINAL PAIN: 0
TROUBLE SWALLOWING: 0
WHEEZING: 0
COLOR CHANGE: 0
BLOOD IN STOOL: 0
SHORTNESS OF BREATH: 0
DIARRHEA: 0

## 2025-07-11 ASSESSMENT — LIFESTYLE VARIABLES
HOW MANY STANDARD DRINKS CONTAINING ALCOHOL DO YOU HAVE ON A TYPICAL DAY: 1 OR 2
HOW OFTEN DO YOU HAVE A DRINK CONTAINING ALCOHOL: MONTHLY OR LESS

## 2025-07-11 ASSESSMENT — SOCIAL DETERMINANTS OF HEALTH (SDOH)
WITHIN THE LAST YEAR, HAVE YOU BEEN KICKED, HIT, SLAPPED, OR OTHERWISE PHYSICALLY HURT BY YOUR PARTNER OR EX-PARTNER?: NO
HOW OFTEN DO YOU ATTEND CHURCH OR RELIGIOUS SERVICES?: 1 TO 4 TIMES PER YEAR
WITHIN THE LAST YEAR, HAVE YOU BEEN AFRAID OF YOUR PARTNER OR EX-PARTNER?: NO
HOW OFTEN DO YOU GET TOGETHER WITH FRIENDS OR RELATIVES?: TWICE A WEEK
ARE YOU MARRIED, WIDOWED, DIVORCED, SEPARATED, NEVER MARRIED, OR LIVING WITH A PARTNER?: PATIENT UNABLE TO ANSWER
HOW OFTEN DO YOU ATTENT MEETINGS OF THE CLUB OR ORGANIZATION YOU BELONG TO?: 1 TO 4 TIMES PER YEAR
IN A TYPICAL WEEK, HOW MANY TIMES DO YOU TALK ON THE PHONE WITH FAMILY, FRIENDS, OR NEIGHBORS?: TWICE A WEEK
WITHIN THE LAST YEAR, HAVE TO BEEN RAPED OR FORCED TO HAVE ANY KIND OF SEXUAL ACTIVITY BY YOUR PARTNER OR EX-PARTNER?: NO
WITHIN THE LAST YEAR, HAVE YOU BEEN HUMILIATED OR EMOTIONALLY ABUSED IN OTHER WAYS BY YOUR PARTNER OR EX-PARTNER?: NO
DO YOU BELONG TO ANY CLUBS OR ORGANIZATIONS SUCH AS CHURCH GROUPS UNIONS, FRATERNAL OR ATHLETIC GROUPS, OR SCHOOL GROUPS?: YES

## 2025-07-11 ASSESSMENT — PATIENT HEALTH QUESTIONNAIRE - PHQ9: DEPRESSION UNABLE TO ASSESS: PT REFUSES

## 2025-07-11 NOTE — ASSESSMENT & PLAN NOTE
Monitored by specialist- no acute findings meriting change in the plan  -Following yearly with dentist, no acute concerns

## 2025-07-11 NOTE — PROGRESS NOTES
Component Value Date/Time    CHOL 173 07/08/2024 03:09 PM    TRIG 59 07/08/2024 03:09 PM    HDL 48 07/08/2024 03:09 PM    GLUCOSE 88 07/08/2024 03:09 PM       The ASCVD Risk score (Sean DK, et al., 2019) failed to calculate for the following reasons:    The 2019 ASCVD risk score is only valid for ages 40 to 79    Immunization History   Administered Date(s) Administered    COVID-19, PFIZER Bivalent, DO NOT Dilute, (age 12y+), IM, 30 mcg/0.3 mL 10/19/2022    COVID-19, PFIZER PURPLE top, DILUTE for use, (age 12 y+), 30mcg/0.3mL 09/20/2021, 10/12/2021    DTaP 2000, 2000, 01/01/2001, 02/05/2002, 07/20/2004    DTaP vaccine 2000, 2000, 01/01/2001, 02/05/2002, 07/20/2004    Hep A, HAVRIX, VAQTA, (age 12m-18y), IM, 0.5mL 11/13/2003, 07/20/2004    Hepatitis A 11/13/2003, 07/20/2004    Hepatitis B (Engerix-B) 01/09/2001, 04/26/2001, 08/01/2001    Hepatitis B vaccine 01/09/2001, 04/26/2001, 08/01/2001    Hib PRP-OMP, PEDVAXHIB, (age 2m-6y, Adlt Risk), IM, 0.5mL 2000, 2000, 01/09/2001, 11/06/2001    Influenza Virus Vaccine 03/13/2021, 03/21/2021    Influenza, FLUCELVAX, (age 6 mo+) IM, Trivalent PF, 0.5mL 08/30/2024    MMR, PRIORIX, M-M-R II, (age 12m+), SC, 0.5mL 11/06/2001, 07/20/2004    Meningococcal ACWY, MENACTRA (MenACWY-D), (age 9m-55y), IM, 0.5mL 07/19/2011, 07/29/2016    PPD Test 03/24/2021, 04/07/2021, 07/31/2023, 08/14/2023, 08/14/2023, 08/14/2023, 07/08/2024, 07/22/2024    Poliovirus, IPOL, (age 6w+), SC/IM, 0.5mL 2000, 2000, 02/05/2002, 07/20/2004    TDaP, ADACEL (age 10y-64y), BOOSTRIX (age 10y+), IM, 0.5mL 07/19/2011, 07/31/2023    Varicella, VARIVAX, (age 12m+), SC, 0.5mL 08/01/2001, 08/13/2008       Health Maintenance   Topic Date Due    HIV screen  Never done    Hepatitis C screen  Never done    COVID-19 Vaccine (4 - 2024-25 season) 09/01/2024    Depression Screen  07/08/2025    HPV vaccine (1 - 3-dose series) 08/11/2025 (Originally 7/3/2015)    Flu vaccine

## 2025-07-11 NOTE — ASSESSMENT & PLAN NOTE
-Following with hematologist, MD Jared Whelan, at [facility], last seen 2021  -Patient advised to call office for f/u appt. Would expect patient to see hematologist at least yearly   -Will f/u with patient pending lab results     Orders:    CBC with Auto Differential; Future    Iron and TIBC; Future    Ferritin; Future    Reticulocytes; Future

## 2025-07-11 NOTE — ASSESSMENT & PLAN NOTE
-Following with hematologist (Jared Rodriguez ) at [facility] ??    Orders:    CBC with Auto Differential; Future    Iron and TIBC; Future    Ferritin; Future

## 2025-07-11 NOTE — ASSESSMENT & PLAN NOTE
-Patient doing well, denies acute concern.   -Attending UC for occupational therapy needs PE form signed and TB blood test.     Orders:    CBC with Auto Differential; Future    Comprehensive Metabolic Panel; Future    TSH reflex to FT4; Future    Vitamin D 25 Hydroxy; Future

## 2025-07-12 LAB
25(OH)D3 SERPL-MCNC: 34 NG/ML
ALBUMIN SERPL-MCNC: 4.3 G/DL (ref 3.4–5)
ALBUMIN/GLOB SERPL: 1.9 {RATIO} (ref 1.1–2.2)
ALP SERPL-CCNC: 71 U/L (ref 40–129)
ALT SERPL-CCNC: 22 U/L (ref 10–40)
ANION GAP SERPL CALCULATED.3IONS-SCNC: 10 MMOL/L (ref 3–16)
AST SERPL-CCNC: 28 U/L (ref 15–37)
BASOPHILS # BLD: 0.1 K/UL (ref 0–0.2)
BASOPHILS NFR BLD: 0.9 %
BILIRUB SERPL-MCNC: 0.3 MG/DL (ref 0–1)
BUN SERPL-MCNC: 10 MG/DL (ref 7–20)
CALCIUM SERPL-MCNC: 9.2 MG/DL (ref 8.3–10.6)
CHLORIDE SERPL-SCNC: 106 MMOL/L (ref 99–110)
CO2 SERPL-SCNC: 23 MMOL/L (ref 21–32)
CREAT SERPL-MCNC: 0.7 MG/DL (ref 0.6–1.1)
DEPRECATED RDW RBC AUTO: 17.6 % (ref 12.4–15.4)
EOSINOPHIL # BLD: 0.1 K/UL (ref 0–0.6)
EOSINOPHIL NFR BLD: 1.8 %
FERRITIN SERPL IA-MCNC: 4.5 NG/ML (ref 15–150)
GFR SERPLBLD CREATININE-BSD FMLA CKD-EPI: >90 ML/MIN/{1.73_M2}
GLUCOSE SERPL-MCNC: 80 MG/DL (ref 70–99)
HCT VFR BLD AUTO: 31.3 % (ref 36–48)
HCV AB SERPL QL IA: NORMAL
HGB BLD-MCNC: 9.7 G/DL (ref 12–16)
HIV 1+2 AB+HIV1 P24 AG SERPL QL IA: NORMAL
HIV 2 AB SERPL QL IA: NORMAL
HIV1 AB SERPL QL IA: NORMAL
HIV1 P24 AG SERPL QL IA: NORMAL
IRON SATN MFR SERPL: 7 % (ref 15–50)
IRON SERPL-MCNC: 36 UG/DL (ref 37–145)
LYMPHOCYTES # BLD: 2.9 K/UL (ref 1–5.1)
LYMPHOCYTES NFR BLD: 41.3 %
MCH RBC QN AUTO: 16.8 PG (ref 26–34)
MCHC RBC AUTO-ENTMCNC: 31 G/DL (ref 31–36)
MCV RBC AUTO: 54.2 FL (ref 80–100)
MONOCYTES # BLD: 0.6 K/UL (ref 0–1.3)
MONOCYTES NFR BLD: 7.9 %
NEUTROPHILS # BLD: 3.4 K/UL (ref 1.7–7.7)
NEUTROPHILS NFR BLD: 48.1 %
PATH INTERP BLD-IMP: NO
PLATELET # BLD AUTO: 436 K/UL (ref 135–450)
PMV BLD AUTO: 9.1 FL (ref 5–10.5)
POTASSIUM SERPL-SCNC: 4.3 MMOL/L (ref 3.5–5.1)
PROT SERPL-MCNC: 6.6 G/DL (ref 6.4–8.2)
RBC # BLD AUTO: 5.78 M/UL (ref 4–5.2)
SODIUM SERPL-SCNC: 139 MMOL/L (ref 136–145)
TIBC SERPL-MCNC: 485 UG/DL (ref 260–445)
TSH SERPL DL<=0.005 MIU/L-ACNC: 0.93 UIU/ML (ref 0.27–4.2)
WBC # BLD AUTO: 7.1 K/UL (ref 4–11)

## 2025-07-13 LAB
QUANTI TB1 MINUS NIL: 0 IU/ML
QUANTI TB2 MINUS NIL: 0.04 IU/ML
QUANTIFERON MITOGEN MINUS NIL: 9.94 IU/ML
QUANTIFERON NIL: 0.06 IU/ML
QUANTIFERON TB INTERPRETATION: NEGATIVE IU/ML